# Patient Record
Sex: MALE | Race: WHITE | NOT HISPANIC OR LATINO | Employment: FULL TIME | URBAN - METROPOLITAN AREA
[De-identification: names, ages, dates, MRNs, and addresses within clinical notes are randomized per-mention and may not be internally consistent; named-entity substitution may affect disease eponyms.]

---

## 2019-10-03 ENCOUNTER — OFFICE VISIT (OUTPATIENT)
Dept: HEMATOLOGY | Facility: CLINIC | Age: 39
End: 2019-10-03
Attending: PHYSICIAN ASSISTANT
Payer: COMMERCIAL

## 2019-10-03 VITALS
HEIGHT: 68 IN | WEIGHT: 247 LBS | SYSTOLIC BLOOD PRESSURE: 146 MMHG | BODY MASS INDEX: 37.44 KG/M2 | HEART RATE: 69 BPM | DIASTOLIC BLOOD PRESSURE: 79 MMHG | RESPIRATION RATE: 14 BRPM | TEMPERATURE: 97.8 F | OXYGEN SATURATION: 96 %

## 2019-10-03 DIAGNOSIS — I82.442 ACUTE DVT OF LEFT TIBIAL VEIN (H): Primary | ICD-10-CM

## 2019-10-03 PROCEDURE — 99203 OFFICE O/P NEW LOW 30 MIN: CPT | Performed by: PHYSICIAN ASSISTANT

## 2019-10-03 PROCEDURE — G0463 HOSPITAL OUTPT CLINIC VISIT: HCPCS

## 2019-10-03 PROCEDURE — 99207 ZZC DOWN CODE DUE TO INITIAL EXAM: CPT | Performed by: PHYSICIAN ASSISTANT

## 2019-10-03 ASSESSMENT — PAIN SCALES - GENERAL: PAINLEVEL: MILD PAIN (2)

## 2019-10-03 ASSESSMENT — MIFFLIN-ST. JEOR: SCORE: 2019.88

## 2019-10-03 NOTE — PATIENT INSTRUCTIONS
Stay on your Xarelto 15 mg twice daily for 21 days then 20 mg daily.  Please take with food around the same time each day.     Consider use of compression stockings during waking hours to prevent or minimize post-thrombotic syndrome (PTS). You may need to get new stockings every 3-6 months with regular wear.    We would like you to return to clinic in ~ 3 months.We would like to get a repeat US of your legs at the same time.  This will serve as a new baseline.     We discussed your extensive family history of clotting and would like you to meet with a genetic counselor to document the clotting history in your family and also to discuss possible genetic testing.    Clinic Visits:  With LIZA Sanches  US of your legs - 1/7/2019 @ 930 at [facility]  Meet with Bharti Maxwell Genetic Counselor - 1/7/19 @ 10:30 at [facility]    Call the [facility]  at [phone]   -If surgeries or procedures are planned.    -If off anticoagulation, please call during high risk times (long-distance travel, broken      bones or trauma, immobilization, surgery, pregnancy, or taking estrogen).   -Any new symptoms of DVT (deep vein thrombosis) or PE (pulmonary embolism)    -pain     -swelling     -redness    -warmth    -shortness of breath    -chest pain    -coughing up blood    Your nurse clinician is Christin Zambrano RN at phone number  [phone].  If she is unavailable and you have immediate concerns, please call [phone] and ask for a nurse.     Christin Zambrano RN - Nurse Clinician - [facility] - [phone]    Interested in learning more about blood clots? The [facility] will be hosting educational events and support groups throughout the year. This is your opportunity to get connected to the newest education and important resources, and meet others that have  experienced blood clots.  If you would like to hear more, please text or call [phone] or email [email or web address] to get added to our mailing list.      [facility]  [address] [place] [address], [place] [phone]                                [facility] [phone]    What is rivaroxaban (Xarelto  )? XARELTO  is a prescription medicine used to treat deep vein thrombosis (DVT) and pulmonary embolism, and to help reduce the risk of these conditions reoccurring.  It is also used to prevent clotting after knee & hip replacement.  It is a direct Xa inhibitor (it stops one of the clotting proteins). Your diet (or Vitamin K intake) does not affect this medication. This drug was FDA approved to treat DVT in 2012, but has been used in [place] since 2008. Xarelto was FDA approved for atrial fibrillation in 2011.    How is Xarelto   usually dosed?  For treatment for a new clot, Xarelto  is dosed 15 mg twice daily with food for 21 days.  After that you take 20 mg once daily.  The dosing to prevent clots in your veins after surgery, long flights, or other high risk times: 10mg once daily.  How do I know if I am taking the right dose? Everyone takes the same dose, no matter their weight.  It was not studied in obese patients.  Currently there is no lab test to check the Xarelto  blood level.  However, such testing is being developed and will likely be available in the future.  What are the possible side effects of Xarelto  ?  The most common side effect of Xarelto  is bleeding (i.e. bleeding from gums, nosebleeds, heavier than normal menstrual bleeding, blood in urine or stool). This risk of bleeding is similar to other oral blood thinners.  Get emergency medical help if you have any of these signs of allergic reaction: hives; difficulty breathing; swelling of your face, lips, tongue, throat; or if you should experience any significant bleeding.   Other  side effects include: headaches, feeling dizzy or weak  What if I have surgery or a procedure scheduled? Please call your doctor about holding this medication prior to surgeries, injections into your joints or spine, or other invasive procedures (i.e. endoscopy/colonoscopy if biopsy or polyp removal needed).  Please notify your dentist that you are taking a blood thinner, although certain procedures may not require holding your Xarelto .  Should I be worried that the reversal agent is not readily available?  The reversal agent for Xarelto , Adexxa , is not readily available. However, Xarelto  wears off fairly quickly. If emergent surgery is required or life-threatening bleeding should occur, blood products and medications that promote clotting may be given. Please get examined for potential bleeding after any trauma or head injury.  What should I tell my doctor before starting Xarelto ? Please tell your doctor about past bleeding problems, liver or kidney problems, if you are pregnant or breastfeeding, or taking the following medications: Ketoconazole, Itraconazole, Ritonavir, Lopinavir, Indinavir, Carbamazepine, Phenytoin, Rifampin, or Joe s wort.

## 2019-10-03 NOTE — NURSING NOTE
Harish AvendanoGillesBee is here for a new consult visit and is  being seen for his unprovoked lower extremity clot dx at [facility] on 9/21..     I introduced myself to the patient and provided him with a contact card containing our information.    Educated patient about the signs, symptoms of and risk factors for venous thrombosis (VTE) and provided an educational  book ean, which reviews these facts. And includes the following web links  for further information:  www.stoptheclot.org, www.clotconnect.org.    Together we reviewed the follow up plan and the printed educational sheets. I assisted him in getting follow up appts with provider, genetics and imaging for the same day.  The AVS instructions were then printed and given to the patient.     I  thanked him for coming and encouraged him to call with any concerns or questions.    Christin Zambrano, RN - Nurse Clinician - [facility] - [phone]

## 2019-10-03 NOTE — PROGRESS NOTES
[facility]  [address] [place] [address]  Phone: [phone], Fax: [phone]      Patient: Harish Carr  MRN: [id number]  : 1980  KIMBERLY: October 3, 2019    Reason:  Recently diagnosed left lower extremity DVT (19).    HPI:  Harish is a 39 yo male who noted left calf pain around  and then had evaluation of the leg on 19. On ultrasound he was found to have an occlusive left tibial vein clot, not extending above the knee.  He had no shortness of breath or chest pain thus no CT was done.  He was started on Xarelto 20mg daily and has been taking this every day.  His leg symptoms have improved but not resolved.  He denies significant swelling in the leg.     He denies any recent injury, no surgery, no periods of immobilization.  He does state he has an extensive family history of deep vein thrombosis notable for his mother who is on long term anticoagulation with warfarin.  Some of her clotting events have occurred on anticoagulation per her report and she is negative for factor V Leiden.  No other thrombophilia is mentioned and he does not know if she was tested for other thrombophilia.       ROS:  On anticoagulation he denies any bleeding issues. Denies any ecchymosis. Denies any lower extremity swelling, but still some pain in left calf. Denies any fever, no chest pain. Denies any shortness of breath.    Past Medical History:  Past Medical History:   Diagnosis Date     Asthma        Past Surgical History:  Past Surgical History:   Procedure Laterality Date     SURGICAL HISTORY OF -       hair transplant       Medications:  Current Outpatient Medications   Medication Sig Dispense Refill     albuterol (PROVENTIL HFA: VENTOLIN HFA) 108 (90 BASE) MCG/ACT inhaler Inhale 2 puffs into the lungs every 6 hours as needed for shortness of breath / dyspnea. 1 Inhaler 1     COMPRESSION STOCKINGS Bilateral knee high graduated compression  "stockings 20-30mmHg 4 each 1     rivaroxaban ANTICOAGULANT (XARELTO) 20 MG TABS tablet Take 20 mg by mouth daily (with dinner)       Rivaroxaban ANTICOAGULANT 15 & 20 MG TBPK 15mg po q 12 hours x 21 days followed by 20mg every evening. 1 each 0     acyclovir (ZOVIRAX) 400 MG tablet Take 1 tablet by mouth 3 times daily. (Patient not taking: Reported on 10/3/2019) 15 tablet 3     CLARITIN 10 MG OR TABS 1 TABLET DAILY       fish oil-omega-3 fatty acids (EQL FISH OIL) 1000 MG capsule Take 2 capsules by mouth daily. 180 capsule 12     fluticasone (FLONASE) 50 MCG/ACT nasal spray 1-2 sprays by Both Nostrils route daily. (Patient not taking: Reported on 10/3/2019) 3 Package 1     LANsoprazole (PREVACID) 30 MG capsule Take 1 capsule by mouth daily. (Patient not taking: Reported on 10/3/2019) 90 capsule 3     Multiple Vitamin (MULTI-VITAMIN) per tablet Take 1 tablet by mouth daily. 100 tablet 3        Allergies:  No Known Allergies    Social History:  Reports no significant tobacco or alchol use. 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    Family History:  Extensive maternal family history.  Mother with repeated clotting events and is on long term anticoagulation.  Has one younger sister who has not had clotting issues - although reports \"mini-stroke\" at time of childbirth.  Has 2 maternal aunts with clotting and maternal grandmother with clotting issues.  Does not recall any of them being diagnosed with a thrombophilia.    Objectives:  Pleasant 38 year old male in no acute distress.  Vitals: B/P: 146/79, T: 97.8, P: 69, R: 14, Wt: 247 lbs 0 oz  Exam:   Evaluation of lower legs bilaterally does not show swelling in the left calf, no edema, no warmth, no tenderness.    Labs:  19: Cr: 1.17  No recent LFTs    Imaging:  Ultrasound of left le19 Positive for occlusive thrombus in one of the posterior tibial veins of the proximal to mid left calf.    Assessment:  Harish is a 37 yo male with " unprovoked distal left lower extremity DVT (9/21/19) with extensive maternal family history of DVT.  No known thrombophilia in family.     Discussed that given his extensive family history and that his clotting event, while in a distal vein, was unprovoked thus we would recommend longer term anticoagulation.  We can re-evaluate this at 3 month follow-up visit.    Plan:  1. Continue with Xarelto but start with 15mg po BID x 21 days and then transition back to 20 mg daily.    2. Return to clinic in 3 months for repeat ultrasound of bilateral legs for baseline.   3. Compression stocking for comfort and periods of immobility.  Rx given.  4. Plan to meet with genetic counselor in this clinic at 3 month follow-up visit and can review family history and plan for genetic thrombophilia testing at that time.  Did not check for lupus anticoagulant today as on Xarelto and seems likely to be inherited thrombophilia.   5. Check renal and hepatic function at 3 month follow-up visit.      Patient understands and agrees with the above plan and recommendation.    Total Time Spent:  45 minutes, all 45 minutes was spent on face-to-face consultation of the patient and coordination of care in regard to recent clotting event and treatment plan.          Zoila Chan MPH, PA-C  [facility]  [facility]  [phone] main line  [phone] pager  [phone] fax

## 2019-11-08 ENCOUNTER — HEALTH MAINTENANCE LETTER (OUTPATIENT)
Age: 39
End: 2019-11-08

## 2020-01-15 ENCOUNTER — HOSPITAL ENCOUNTER (OUTPATIENT)
Dept: ULTRASOUND IMAGING | Facility: CLINIC | Age: 40
Discharge: HOME | End: 2020-01-15
Attending: PHYSICIAN ASSISTANT | Admitting: PHYSICIAN ASSISTANT
Payer: COMMERCIAL

## 2020-01-15 DIAGNOSIS — I82.442 ACUTE DVT OF LEFT TIBIAL VEIN (H): ICD-10-CM

## 2020-01-15 PROCEDURE — 93970 EXTREMITY STUDY: CPT

## 2020-01-20 ENCOUNTER — OFFICE VISIT (OUTPATIENT)
Dept: HEMATOLOGY | Facility: CLINIC | Age: 40
End: 2020-01-20
Attending: PHYSICIAN ASSISTANT
Payer: COMMERCIAL

## 2020-01-20 ENCOUNTER — OFFICE VISIT (OUTPATIENT)
Dept: HEMATOLOGY | Facility: CLINIC | Age: 40
End: 2020-01-20
Attending: GENETIC COUNSELOR, MS
Payer: COMMERCIAL

## 2020-01-20 VITALS
RESPIRATION RATE: 16 BRPM | HEIGHT: 68 IN | SYSTOLIC BLOOD PRESSURE: 122 MMHG | OXYGEN SATURATION: 97 % | BODY MASS INDEX: 37.74 KG/M2 | DIASTOLIC BLOOD PRESSURE: 84 MMHG | WEIGHT: 249 LBS | HEART RATE: 96 BPM

## 2020-01-20 DIAGNOSIS — Z82.49 FAMILY HISTORY OF BLOOD CLOTS: Primary | ICD-10-CM

## 2020-01-20 DIAGNOSIS — I82.442 ACUTE DVT OF LEFT TIBIAL VEIN (H): Primary | ICD-10-CM

## 2020-01-20 DIAGNOSIS — Z86.718 PERSONAL HISTORY OF DVT (DEEP VEIN THROMBOSIS): ICD-10-CM

## 2020-01-20 DIAGNOSIS — Z80.42 FAMILY HISTORY OF PROSTATE CANCER: ICD-10-CM

## 2020-01-20 DIAGNOSIS — Z82.49 FAMILY HISTORY OF BLOOD CLOTS: ICD-10-CM

## 2020-01-20 DIAGNOSIS — Z80.3 FAMILY HISTORY OF MALIGNANT NEOPLASM OF BREAST: ICD-10-CM

## 2020-01-20 DIAGNOSIS — Z82.3 FAMILY HISTORY OF STROKE: ICD-10-CM

## 2020-01-20 PROCEDURE — 99214 OFFICE O/P EST MOD 30 MIN: CPT | Performed by: PHYSICIAN ASSISTANT

## 2020-01-20 PROCEDURE — 40000269 ZZH STATISTIC NO CHARGE FACILITY FEE

## 2020-01-20 ASSESSMENT — PAIN SCALES - GENERAL: PAINLEVEL: NO PAIN (0)

## 2020-01-20 ASSESSMENT — MIFFLIN-ST. JEOR: SCORE: 2023.83

## 2020-01-20 NOTE — PROGRESS NOTES
"2020    Presenting Information: Harish Gamboa was seen for an initial evaluation at the [facility] today. I met with him per the request of Zoila Chan, MPH PA-C to obtain a family history. He is here with his mother today.    Personal History:   Briefly, Harish is a 39 year old man with a personal history of a DVT.  Please see Zoila's note for additional details regarding his personal history.     Family History: A three generation pedigree, specific to clotting was obtained today and scanned into the EMR. The following information is significant:     Sister, age 36, had a TIA after delivering her second child at age 32. She has two children, ages 6 and 4.      Mother, age 66 had a TIA at 63. She also has had five DVTs and one PE. Her first DVT was at 28, and then had a DVT and PE at 30. She also reported two DVTs at age 43 and one at age 44. She also had four \"possible clots\" at 64 and 65. One was following a colonoscopy, and it was never determined if she truly had a clot. She had another clot in her other leg after a fall at age 65, which was determined to be a true blood clot. She reports testing negative for Factor V Leiden. She reports having high factor VIII levels in the past.     Lino also reported a strong family history of cancer on his mother's side of the family. His mother's sister had breast cancer at 33. He also has had five maternal uncles (out of six) who had prostate cancer, one of which  from this cancer. His maternal grandfather also had prostate cancer.  Two paternal aunts also had breast cancer in their 60s.    Maternal aunt had a blood clot in her arm at age 68. She has breast cancer at age 33, as noted above.     Maternal aunt had a blood clot in her leg at 70.    Maternal uncle had a stroke at 70.    Maternal grandmother  at 82. She had a past history of a blood clot after delivery in her leg at age 25. She had another clot in her " "leg after a surgery at 50. She had a heart attack at 63 and a stroke at 64. She had also had TIAs.     Maternal-maternal great grandmother  at 42 from a \"blood disease\", that may have triggered a PE in the setting of possible gangrene. The details of this are not clear.     The family history is otherwise negative for clotting.    We also discussed Lino's strong family history of prostate and breast cancer. We discussed that there are genes that can cause increased risk for these cancers, and could impact cancer screening that he may receive if he has a harmful mutation in one of these genes.  We discussed the option of a cancer genetic counseling appointment in the [facility]. Harish was interested, and a referral was placed for this service. He was also given an informational brochure on the program, which includes the scheduling number. He was encouraged to call the scheduling number if he has not received a call from scheduling in about one week.     Plan:   1. A three generation pedigree was obtained and scanned into the EMR.  Additional questions or concerns were denied.  2. I reviewed this family history of clotting with Zoila, and she will review plans for testing for hereditary thrombophilia with Lino today.    Approximate Time Spent in Consultation: 15 minutes.     Bharti Durán MS, [facility]  Licensed Genetic Counselor  P. [phone]  F. [phone]    CC: No Letter       "

## 2020-01-20 NOTE — PROGRESS NOTES
[facility]  [address] [place] [address]  Phone: [phone], Fax: [phone]      Patient: Harish Carr  MRN: [id number]  : 1980  KIMBERLY: 2020    Reason:  History of left lower extremity DVT (19).    HPI:  Harish is a 38 yo male who noted left calf pain around 19 and then had evaluation of the leg on 19. On ultrasound he was found to have an occlusive left tibial vein clot, not extending above the knee.  He had no shortness of breath or chest pain thus no CT was done.  He was started on Xarelto 20mg daily and has been taking this every day.  His leg symptoms have improved but not resolved.  He denies significant swelling in the leg.     He denies any related injury, no surgery, no periods of immobilization.  He does state he has an extensive family history of deep vein thrombosis notable for his mother who is on long term anticoagulation with warfarin.  Some of her clotting events have occurred on anticoagulation per her report and she is negative for factor V Leiden/Prothrombin gene mutation.  No other thrombophilia testing was done on mother.      ROS:  On anticoagulation he denies any bleeding issues. Denies any ecchymosis. Denies any lower extremity swelling, but still some pain in left calf. Denies any fever, no chest pain. Denies any shortness of breath.    Past Medical History:  Past Medical History:   Diagnosis Date     Asthma        Past Surgical History:  Past Surgical History:   Procedure Laterality Date     SURGICAL HISTORY OF -       hair transplant       Medications:  Current Outpatient Medications   Medication Sig Dispense Refill     acyclovir (ZOVIRAX) 400 MG tablet Take 1 tablet by mouth 3 times daily. (Patient not taking: Reported on 10/3/2019) 15 tablet 3     albuterol (PROVENTIL HFA: VENTOLIN HFA) 108 (90 BASE) MCG/ACT inhaler Inhale 2 puffs into the lungs every 6 hours as needed for shortness of  "breath / dyspnea. 1 Inhaler 1     CLARITIN 10 MG OR TABS 1 TABLET DAILY       COMPRESSION STOCKINGS Bilateral knee high graduated compression stockings 20-30mmHg 4 each 1     fluticasone (FLONASE) 50 MCG/ACT nasal spray 1-2 sprays by Both Nostrils route daily. 3 Package 1     LANsoprazole (PREVACID) 30 MG capsule Take 1 capsule by mouth daily. 90 capsule 3     rivaroxaban ANTICOAGULANT (XARELTO) 20 MG TABS tablet Take 20 mg by mouth daily (with dinner)          Allergies:  No Known Allergies    Social History:  Reports no significant tobacco or alchol use. 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    Family History:  Extensive maternal family history.  Mother with repeated clotting events and is on long term anticoagulation.  Has one younger sister who has not had clotting issues - although reports \"mini-stroke\" at time of childbirth.  Has 2 maternal aunts with clotting and maternal grandmother with clotting issues.  Does not recall any of them being diagnosed with a thrombophilia.    Harish is here today for follow-up with mother and they met with genetics counselor and family history was reviewed and outlined.    Objectives:  Pleasant 38 year old male in no acute distress.  There were no vitals taken for this visit.  Exam:   Evaluation of lower legs bilaterally does not show swelling in the left calf, no edema, no warmth, no tenderness.    Labs:  19: Cr: 1.17  No recent LFTs    Imaging:  Ultrasound of left le19 Positive for occlusive thrombus in one of the posterior tibial veins of the proximal to mid left calf.    1/15/20:  The common femoral, greater saphenous origin, femoral, popliteal, and  deep calf veins are visualized and are patent. Venous waveforms are  normal. There is normal response to compression.       Assessment:  Harish is a 40 yo male with unprovoked distal left lower extremity DVT (19) which has now resolved.  He has extensive maternal family history of " DVT.  No known thrombophilia in family.     Discussed that given his extensive family history and that his clotting event, while in a distal vein, was unprovoked thus we would recommend longer term anticoagulation.  He is in agreement with this plan.  We would like to do thrombophilia testing on Harish, ideally off Xarelto.  We will plan for him to be off for 10-14 days and then have thrombophilia testing done and then restart.      Plan:  1. Continue with Xarelto 20mg daily.  Will have a 10-14 day break to have thrombophilia testing done.  Orders have been placed.  Will call him with results. Need to see annually for chronic anticoagulation follow-up.  2. Compression stocking for comfort and periods of immobility.    3.  Check renal and hepatic function with thrombophilia testing.    Patient understands and agrees with the above plan and recommendation.    Total Time Spent:  25 minutes, all 25 minutes was spent on face-to-face consultation of the patient and coordination of care in regard to recent clotting event and treatment plan.          Zoila Chan MPH, PA-C  [facility]  [facility]  [phone] main line  [phone] pager  [phone] fax

## 2020-01-20 NOTE — NURSING NOTE
Harish Gamboa is here for a return visit and is  being seen for history of VTE and family history of VTE.  Seen by genetic counselor today.    Vital signs were taken and assessed.  Allergies and medications were reviewed and updated by [facility] staff.    I greeted Lino and his mom briefly - they have our contact card containing our information.    The follow up plan was developed by the provider confirmed with the patient.     I  thanked them for coming and encouraged them to call with any concerns or questions.    Christin Zambrano RN - Nurse Clinician - [facility] - [phone]

## 2020-01-21 NOTE — TELEPHONE ENCOUNTER
ONCOLOGY INTAKE: Records Information      APPT INFORMATION:  Referring provider: Bharti Durán GC  Referring provider s clinic:  [facility]  Reason for visit/diagnosis: Family history of malignant neoplasm of breast; Family history of prostate cancer  Has patient been notified of appointment date and time?: Yes    RECORDS INFORMATION:  Were the records received with the referral (via [employer])? No    Has patient been seen for any external appt for this diagnosis? No    If yes, where? N/a    Has patient had any imaging or procedures outside of [facility] for this condition? No      If Yes, where? N/a    ADDITIONAL INFORMATION:  None

## 2020-01-22 NOTE — TELEPHONE ENCOUNTER
RECORDS STATUS - BREAST    RECORDS REQUESTED FROM: [employer]   DATE REQUESTED: 3/27/2020   NOTES DETAILS STATUS   OFFICE NOTE from referring provider Bhatri Rubin GC   OFFICE NOTE from medical oncologist N/A    OFFICE NOTE from surgeon N/A    OFFICE NOTE from radiation oncologist     OPERATIVE REPORT     LABS     PATHOLOGY REPORTS  (Tissue diagnosis, Stage, ER/VA percentage positive and intensity of staining, HER2 IHC, FISH, and all biopsies from breast and any distant metastasis)                     GENONOMIC TESTING     TYPE:   (Next Generation Sequencing, including Foundation One testing, and Oncotype score)     IMAGING (NEED IMAGES & REPORT)     CT SCANS N/A    MRI     MAMMO     ULTRASOUND     PET     BONE SCAN     BRAIN MRI

## 2020-02-21 DIAGNOSIS — Z79.01 CHRONIC ANTICOAGULATION: ICD-10-CM

## 2020-02-21 DIAGNOSIS — Z86.718 PERSONAL HISTORY OF DVT (DEEP VEIN THROMBOSIS): Primary | ICD-10-CM

## 2020-03-06 ENCOUNTER — ALLIED HEALTH/NURSE VISIT (OUTPATIENT)
Dept: HEMATOLOGY | Facility: CLINIC | Age: 40
End: 2020-03-06
Payer: COMMERCIAL

## 2020-03-06 DIAGNOSIS — Z82.49 FAMILY HISTORY OF BLOOD CLOTS: ICD-10-CM

## 2020-03-06 DIAGNOSIS — Z86.718 PERSONAL HISTORY OF DVT (DEEP VEIN THROMBOSIS): ICD-10-CM

## 2020-03-06 LAB
ALBUMIN SERPL-MCNC: 4.2 G/DL (ref 3.4–5)
ALP SERPL-CCNC: 65 U/L (ref 40–150)
ALT SERPL W P-5'-P-CCNC: 41 U/L (ref 0–70)
ANION GAP SERPL CALCULATED.3IONS-SCNC: 5 MMOL/L (ref 3–14)
APTT PPP: 28 SEC (ref 22–37)
AST SERPL W P-5'-P-CCNC: 23 U/L (ref 0–45)
AT III ACT/NOR PPP CHRO: 107 % (ref 85–135)
BILIRUB SERPL-MCNC: 0.6 MG/DL (ref 0.2–1.3)
BUN SERPL-MCNC: 13 MG/DL (ref 7–30)
CALCIUM SERPL-MCNC: 9.1 MG/DL (ref 8.5–10.1)
CARDIOLIPIN ANTIBODY IGG: <1.6 GPL-U/ML (ref 0–19.9)
CARDIOLIPIN ANTIBODY IGM: <0.2 MPL-U/ML (ref 0–19.9)
CHLORIDE SERPL-SCNC: 109 MMOL/L (ref 94–109)
CO2 SERPL-SCNC: 27 MMOL/L (ref 20–32)
CREAT SERPL-MCNC: 0.96 MG/DL (ref 0.66–1.25)
GFR SERPL CREATININE-BSD FRML MDRD: >90 ML/MIN/{1.73_M2}
GLUCOSE SERPL-MCNC: 97 MG/DL (ref 70–99)
POTASSIUM SERPL-SCNC: 4.2 MMOL/L (ref 3.4–5.3)
PROT C ACT/NOR PPP CHRO: 100 % (ref 70–170)
PROT SERPL-MCNC: 7.5 G/DL (ref 6.8–8.8)
SODIUM SERPL-SCNC: 141 MMOL/L (ref 133–144)

## 2020-03-06 PROCEDURE — 85730 THROMBOPLASTIN TIME PARTIAL: CPT | Performed by: PHYSICIAN ASSISTANT

## 2020-03-06 PROCEDURE — 85613 RUSSELL VIPER VENOM DILUTED: CPT | Performed by: PHYSICIAN ASSISTANT

## 2020-03-06 PROCEDURE — 85300 ANTITHROMBIN III ACTIVITY: CPT | Performed by: PHYSICIAN ASSISTANT

## 2020-03-06 PROCEDURE — 86146 BETA-2 GLYCOPROTEIN ANTIBODY: CPT | Performed by: PHYSICIAN ASSISTANT

## 2020-03-06 PROCEDURE — 36415 COLL VENOUS BLD VENIPUNCTURE: CPT

## 2020-03-06 PROCEDURE — 00000401 ZZHCL STATISTIC THROMBIN TIME NC: Performed by: PHYSICIAN ASSISTANT

## 2020-03-06 PROCEDURE — 86147 CARDIOLIPIN ANTIBODY EA IG: CPT | Performed by: PHYSICIAN ASSISTANT

## 2020-03-06 PROCEDURE — 81240 F2 GENE: CPT | Performed by: PHYSICIAN ASSISTANT

## 2020-03-06 PROCEDURE — 00000167 ZZHCL STATISTIC INR NC: Performed by: PHYSICIAN ASSISTANT

## 2020-03-06 PROCEDURE — 80053 COMPREHEN METABOLIC PANEL: CPT | Performed by: PHYSICIAN ASSISTANT

## 2020-03-06 PROCEDURE — 81241 F5 GENE: CPT | Performed by: PHYSICIAN ASSISTANT

## 2020-03-06 PROCEDURE — 85306 CLOT INHIBIT PROT S FREE: CPT | Performed by: PHYSICIAN ASSISTANT

## 2020-03-06 PROCEDURE — 85303 CLOT INHIBIT PROT C ACTIVITY: CPT | Performed by: PHYSICIAN ASSISTANT

## 2020-03-06 NOTE — NURSING NOTE
Special coagulation notified that patient has been off Xarelto for 12 days prior to venous collection so Lupus anticoagulant analysis can be validated. He will restart Rivaroxaban today. He also reports learning more VTE family history. I will let his team know so information can be updated.

## 2020-03-09 LAB
B2 GLYCOPROT1 IGG SERPL IA-ACNC: <0.6 U/ML
B2 GLYCOPROT1 IGM SERPL IA-ACNC: <2.9 U/ML
LA PPP-IMP: NEGATIVE
PROT S FREE AG ACT/NOR PPP IA: 165 % (ref 70–148)

## 2020-03-10 LAB — COPATH REPORT: NORMAL

## 2020-03-12 ENCOUNTER — TELEPHONE (OUTPATIENT)
Dept: HEMATOLOGY | Facility: CLINIC | Age: 40
End: 2020-03-12

## 2020-03-12 NOTE — TELEPHONE ENCOUNTER
3/12/2020    Called patient with results of thrombophilia testing.  All results were negative/normal and done off Xarelto briefly (see below).     He would like to remain on anticoagulation due to family and personal history.  Will continue Xarelto 20mg daily.  Briefly discussed option of lower dose Xarelto and for now will remain on 20mg, as not having bleeding issues and is low risk for bleeding with age and health status.     Cr and ALT/AST normal as well.    PLAN:   Continue Xarelto and return to clinic January 2021.  Call with any bleeding issues or plans for procedures.    Zoila Chan PA-C    Results for YOSSI DOLORESKVNG ALLENANTONIO DELANO (MRN [id number]) as of 3/12/2020 14:06   Ref. Range 3/6/2020 08:35   PTT Latest Ref Range: 22 - 37 sec 28   Antithrombin III Chromogenic Latest Ref Range: 85 - 135 % 107   Lupus Result Latest Ref Range: NEG^Negative  Negative   Prot C Chromogenic Latest Ref Range: 70 - 170 % 100   Protein S Free Latest Ref Range: 70 - 148 % 165 (H)  Not a risk for clotting   Cardiolipin Antibody IgG Latest Ref Range: 0.0 - 19.9 GPL-U/mL <1.6   Cardiolipin Antibody IgM Latest Ref Range: 0.0 - 19.9 MPL-U/mL <0.2   Beta 2 Glycoprotein 1 Antibody IgG Latest Ref Range: <7 U/mL <0.6   Beta 2 Glycoprotein 1 Antibody IgM Latest Ref Range: <7 U/mL <2.9     Results for ROLANDO SETHI (MRN [id number]) as of 3/12/2020 14:06   Ref. Range 3/6/2020 08:35   FACTOR 2 AND 5 MUTATION ANALYSIS Unknown Negative for both

## 2020-03-27 ENCOUNTER — PRE VISIT (OUTPATIENT)
Dept: ONCOLOGY | Facility: CLINIC | Age: 40
End: 2020-03-27

## 2020-06-29 ENCOUNTER — TELEPHONE (OUTPATIENT)
Dept: HEMATOLOGY | Facility: CLINIC | Age: 40
End: 2020-06-29

## 2020-06-29 DIAGNOSIS — Z86.718 PERSONAL HISTORY OF DVT (DEEP VEIN THROMBOSIS): Primary | ICD-10-CM

## 2020-06-29 NOTE — TELEPHONE ENCOUNTER
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    He is calling today with new symptoms that are of concern for him.  For the past 6 days, he has been having a feeling of discomfort on medial aspect of his left calf.  He describes this as feeling like a pulled muscle but repositioning leg does not alleviate symptoms.  For the past 4 days, he has had tingling sensation on the lateral aspect of the calf.    He denies swelling or redness in the leg and denies CP.  He has some SOB but attributes this to his seasonal allergies and asthma.      He is active with walking his dog several miles twice daily.  He has had no recent injury or other change in his activity.    Discussed with Zoila Chan PA-C and Dr. Andrew Newman.  Zoila will talk with patient to develop further plan.    Christin Zambrano RN - Nurse Clinician - [facility] - [phone]      Discussed smptoms with patient and decision was made to proceed with scheduling an ultrasound later in the week if symptoms persist.  He has not missed any doses of his Xarelto making the risk of thrombosis minimal.    Order for left lower extremity ultrasound to be done 7/2/2020 if symptoms persist.    Zoila Chan PA-C

## 2020-07-02 ENCOUNTER — HOSPITAL ENCOUNTER (OUTPATIENT)
Dept: ULTRASOUND IMAGING | Facility: CLINIC | Age: 40
Discharge: HOME | End: 2020-07-02
Attending: PHYSICIAN ASSISTANT | Admitting: PHYSICIAN ASSISTANT
Payer: COMMERCIAL

## 2020-07-02 DIAGNOSIS — Z86.718 PERSONAL HISTORY OF DVT (DEEP VEIN THROMBOSIS): ICD-10-CM

## 2020-07-02 PROCEDURE — 93971 EXTREMITY STUDY: CPT | Mod: LT

## 2020-08-17 ENCOUNTER — TRANSFERRED RECORDS (OUTPATIENT)
Dept: HEALTH INFORMATION MANAGEMENT | Facility: CLINIC | Age: 40
End: 2020-08-17

## 2020-09-16 ENCOUNTER — VIRTUAL VISIT (OUTPATIENT)
Dept: ONCOLOGY | Facility: CLINIC | Age: 40
End: 2020-09-16
Attending: GENETIC COUNSELOR, MS
Payer: COMMERCIAL

## 2020-09-16 DIAGNOSIS — Z80.42 FAMILY HISTORY OF PROSTATE CANCER: Primary | ICD-10-CM

## 2020-09-16 DIAGNOSIS — Z80.8 FAMILY HISTORY OF MELANOMA: ICD-10-CM

## 2020-09-16 DIAGNOSIS — Z80.3 FAMILY HISTORY OF MALIGNANT NEOPLASM OF BREAST: ICD-10-CM

## 2020-09-16 PROCEDURE — 96040 ZZH GENETIC COUNSELING, EACH 30 MINUTES: CPT | Mod: 95,ZF | Performed by: GENETIC COUNSELOR, MS

## 2020-09-16 NOTE — PATIENT INSTRUCTIONS
Assessing Cancer Risk  Only about 5-10% of cancers are thought to be due to an inherited cancer susceptibility gene.    These families often have:    Several people with the same or related types of cancer    Cancers diagnosed at a young age (before age 50)    Individuals with more than one primary cancer    Multiple generations of the family affected with cancer    Some people may be candidates for genetic testing of more than one gene.  For these families, genetic testing using a cancer panel may be offered.  These panels will test different genes known to increase the risk for breast, ovarian, uterine, and/or other cancers. All of the genes discussed below have published clinical management guidelines for individuals who are found to carry a mutation. The purpose of this handout is to serve as a brief summary of the genes analyzed by the panels used to inquire about hereditary breast and gynecologic cancer:  ZEN, BRCA1, BRCA2, BRIP1, CDH1, CHEK2, MLH1, MSH2, MSH6, PMS2, EPCAM, PTEN, PALB2, RAD51C, RAD51D, and TP53.  ______________________________________________________________________________  Hereditary Breast and Ovarian Cancer Syndrome   (BRCA1 and BRCA2)  A single mutation in one of the copies of BRCA1 or BRCA2 increases the risk for breast and ovarian cancer, among others.  The risk for pancreatic cancer and melanoma may also be slightly increased in some families.  The chart below shows the chance that someone with a BRCA mutation would develop cancer in his or her lifetime1,2,3,4.        A person s ethnic background is also important to consider, as individuals of Ashkenazi Nondenominational ancestry have a higher chance of having a BRCA gene mutation.  There are three BRCA mutations that occur more frequently in this population.    Peres Syndrome   (MLH1, MSH2, MSH6, PMS2, and EPCAM)  Currently five genes are known to cause Peres Syndrome: MLH1, MSH2, MSH6, PMS2, and EPCAM.  A single mutation in one of the  Peres Syndrome genes increases the risk for colon, endometrial, ovarian, and stomach cancers.  Other cancers that occur less commonly in Peres Syndrome include urinary tract, skin, and brain cancers.  The chart below shows the chance that a person with Peres syndrome would develop cancer in his or her lifetime5.      *Cancer risk varies depending on Peres syndrome gene found    Cowden Syndrome   (PTEN)  Cowden syndrome is a hereditary condition that increases the risk for breast, thyroid, endometrial, colon, and kidney cancer.  Cowden syndrome is caused by a mutation in the PTEN gene.  A single mutation in one of the copies of PTEN causes Cowden syndrome and increases cancer risk.  The chart below shows the chance that someone with a PTEN mutation would develop cancer in their lifetime6,7.  Other benign features seen in some individuals with Cowden syndrome include benign skin lesions (facial papules, keratoses, lipomas), learning disability, autism, thyroid nodules, colon polyps, and larger head size.      *One recent study found breast cancer risk to be increased to 85%    Li-Fraumeni Syndrome   (TP53)  Li-Fraumeni Syndrome (LFS) is a cancer predisposition syndrome caused by a mutation in the TP53 gene. A single mutation in one of the copies of TP53 increases the risk for multiple cancers. Individuals with LFS are at an increased risk for developing cancer at a young age. The lifetime risk for development of a LFS-associated cancer is 50% by age 30 and 90% by age 60.   Core Cancers: Sarcomas, Breast, Brain, Lung, Leukemias/Lymphomas, Adrenocortical carcinomas  Other Cancers: Gastrointestinal, Thyroid, Skin, Genitourinary    Hereditary Diffuse Gastric Cancer   (CDH1)  Currently, one gene is known to cause hereditary diffuse gastric cancer (HDGC): CDH1.  Individuals with HDGC are at increased risk for diffuse gastric cancer and lobular breast cancer. Of people diagnosed with HDGC, 30-50% have a mutation in the CDH1  gene.  This suggests there are likely other genes that may cause HDGC that have not been identified yet.      Lifetime Cancer Risks    General Population HDGC    Diffuse Gastric  <1% ~80%   Breast 12% 39-52%         Additional Genes  ZEN  ZEN is a moderate-risk breast cancer gene. Women who have a mutation in ZEN can have between a 2-4 fold increased risk for breast cancer compared to the general population8. ZEN mutations have also been associated with increased risk for pancreatic cancer, however an estimate of this cancer risk is not well understood9. Individuals who inherit two ZEN mutations have a condition called ataxia-telangiectasia (AT).  This rare autosomal recessive condition affects the nervous system and immune system, and is associated with progressive cerebellar ataxia beginning in childhood.  Individuals with ataxia-telangiectasia often have a weakened immune system and have an increased risk for childhood cancers.    PALB2  Mutations in PALB2 have been shown to increase the risk of breast cancer up to 33-58% in some families; where individuals fall within this risk range is dependent upon family [id number]. PALB2 mutations have also been associated with increased risk for pancreatic cancer, although this risk has not been quantified yet.  Individuals who inherit two PALB2 mutations--one from their mother and one from their father--have a condition called Fanconi Anemia.  This rare autosomal recessive condition is associated with short stature, developmental delay, bone marrow failure, and increased risk for childhood cancers.    CHEK2   CHEK2 is a moderate-risk breast cancer gene.  Women who have a mutation in CHEK2 have around a 2-fold increased risk for breast cancer compared to the general population, and this risk may be higher depending upon family history.11,12,13 Mutations in CHEK2 have also been shown to increase the risk of a number of other cancers, including colon and prostate, however  these cancer risks are currently not well understood.    BRIP1, RAD51C and RAD51D  Mutations in BRIP1, RAD51C, and RAD51D have been shown to increase the risk of ovarian cancer and possibly female breast cancer as well14,15 .       Lifetime Cancer Risk    General Population BRIP1 RAD51C RAD51D   Ovarian 1-2% ~5-8% ~5-9% ~7-15%           Inheritance  All of the cancer syndromes reviewed above are inherited in an autosomal dominant pattern.  This means that if a parent has a mutation, each of his or her children will have a 50% chance of inheriting that same mutation.  Therefore, each child--male or female--would have a 50% chance of being at increased risk for developing cancer.      Image obtained from Genetics Home Reference, 2013     Mutations in some genes can occur de monique, which means that a person s mutation occurred for the first time in them and was not inherited from a parent.  Now that they have the mutation, however, it can be passed on to future generations.    Genetic Testing  Genetic testing involves a blood test and will look at the genetic information in the ZEN, BRCA1, BRCA2, BRIP1, CDH1, CHEK2, MLH1, MSH2, MSH6, PMS2, EPCAM, PTEN, PALB2, RAD51C, RAD51D, and TP53 genes for any harmful mutations that are associated with increased cancer risk.  If possible, it is recommended that the person(s) who has had cancer be tested before other family members.  That person will give us the most useful information about whether or not a specific gene is associated with the cancer in the family.    Results  There are three possible results of genetic testing:    Positive--a harmful mutation was identified in one or more of the genes    Negative--no mutation was identified in any of the genes on this panel    Variant of unknown significance--a variation in one of the genes was identified, but it is unclear how this impacts cancer risk in the family    Advantages and Disadvantages   There are advantages and  disadvantages to genetic testing.    Advantages    May clarify your cancer risk    Can help you make medical decisions    May explain the cancers in your family    May give useful information to your family members (if you share your results)    Disadvantages    Possible negative emotional impact of learning about inherited cancer risk    Uncertainty in interpreting a negative test result in some situations    Possible genetic discrimination concerns (see below)    Genetic Information Nondiscrimination Act (BRNEDON)  BRENDON is a federal law that protects individuals from health insurance or employment discrimination based on a genetic test result alone.  Although rare, there are currently no legal discrimination protections in terms of life insurance, long term care, or disability insurances.  Visit the [employer] [place] website to learn more.    Reducing Cancer Risk  All of the genes described above have nationally recognized cancer screening guidelines that would be recommended for individuals who test positive.  In addition to increased cancer screening, surgeries may be offered or recommended to reduce cancer risk.  Recommendations are based upon an individual s genetic test result as well as their personal and family history of cancer.    Questions to Think About Regarding Genetic Testing:    What effect will the test result have on me and my relationship with my family members if I have an inherited gene mutation?  If I don t have a gene mutation?    Should I share my test results, and how will my family react to this news, which may also affect them?    Are my children ready to learn new information that may one day affect their own health?    Hereditary Cancer Resources    FORCE: Facing Our Risk of Cancer Empowered facingourrisk.org   Bright Pink bebrightpink.org   Li-Fraumeni Syndrome Association lfsassociation.org   PTEN World PTENworld.com   No stomach for cancer, Inc.  nostomachforcancer.org   Stomach cancer relief network Scrnet.org   Collaborative Group of the Americas on Inherited Colorectal Cancer (CGA) cgaicc.com    Cancer Care cancercare.org   American Cancer Society (ACS) cancer.org   National Cancer [place] (NCI) cancer.gov     Please call us if you have any questions or concerns.   Cancer Risk Management Program [phone] ([phone])  ? Lele Snell, MS, [facility] [phone]  ? Cielo Box, MS, [facility]  [phone]  ? Pauline Wills, MS, [facility]  [phone]  ? Carole Murphy, MS, [facility] [phone]  ? Aspen Zo, MS, [facility] [phone]  ? Matthew Jesus, MS, [facility]  [phone]  ? Leslie Wright, MS, [facility]  [phone]    References  1. James RICHMOND, Debi PDP, Mariella S, Cee BRICE, Taina JE, Johnnie JL, Stephania N, Camden H, David O, Nunu A, Andreasini B, Radiosmin P, Mandavidkijoshua S, Ricardo DM, Hernández N, Sunny E, Ar H, Blaine E, Beti J, Gronlatha J, Sammy B, Marcious H, Thorlacius S, Eerola H, Nevjoshualinna H, Antoine K, Polly OP. Average risks of breast and ovarian cancer associated with BRCA1 or BRCA2 mutations detected in case series unselected for family history: a combined analysis of 222 studies. Am J Hum Elissa. 2003;72:1117-30.  2. Chavez N, Jimena M, Cage G.  BRCA1 and BRCA2 Hereditary Breast and Ovarian Cancer. Gene Reviews online. 2013.  3. Bentley YC, Jessica S, Scott G, Ware S. Breast cancer risk among male BRCA1 and BRCA2 mutation carriers. J Natl Cancer Inst. 2007;99:1811-4.  4. John CORONEL, Shannon I, Carlos Manuel J, Kyle E, Karel ER, Macy F. Risk of breast cancer in male BRCA2 carriers. J Med Elissa. 2010;47:710-1.  5. National Comprehensive Cancer Network. Clinical practice guidelines in oncology, colorectal cancer screening. Available online (registration required). 2015.  6. Ivan PROCTOR, Pantera J, Jonnie J, Eitan LA, Guanako FRANCOIS, Giorgio C. Lifetime cancer risks in individuals with germline PTEN mutations. Clin Cancer Res. 2012;18:400-7.  7. Pilarski R. Cowden  Syndrome: A Critical Review of the Clinical Literature. J Elissa . 2009:18:13-27.  8. Jackie A, Noe D, Saul S, Radha P, Fredrick T, Rupal M, Tono B, Keyla H, Marcelo R, Bassam K, Tana L, John DG, Ricardo D, Clay DF, Leola MR, The Breast Cancer Susceptibility Collaboration ([place]) & Tania JOSE. ZEN mutations that cause ataxia-telangiectasia are breast cancer susceptibility alleles. Nature Genetics. 2006;38:873-875  9. Michael N , Zamzam Y, Talia J, Bridgette L, Angie GM , Ivonne ML, Gallinger S, Benedict AG, Syngal S, Betsy ML, Natan J , Brennon R, Ambreen SZ, Ritu JR, Dayo VE, Lupis M, Vocezar B, Elba N, Robert RH, Kaleb KW, and Meño AP. ZEN mutations in patients with hereditary pancreatic cancer. Cancer Discover. 2012;2:41-46  10. James BANKS, et al. Breast-Cancer Risk in Families with Mutations in PALB2. NEJM. 2014; 371(6):497-506.  11. CHEK2 Breast Cancer Case-Control Consortium. CHEK2*1100delC and susceptibility to breast cancer: A collaborative analysis involving 10,860 breast cancer cases and 9,065 controls from 10 studies. Am J Hum Elissa, 74 (2004), pp. 2672-1426  12. Beba T, Negro S, Mckay K, et al. Spectrum of Mutations in BRCA1, BRCA2, CHEK2, and TP53 in Families at High Risk of Breast Cancer. LETTY. 2006;295(12):6239-0924.   13. Migue C, Pearl D, Nannette A, et al. Risk of breast cancer in women with a CHEK2 mutation with and without a family history of breast cancer. J Clin Oncol. 2011;29:5206-5502.  14. See H, Albert E, Elise SJ, et al. Contribution of germline mutations in the RAD51B, RAD51C, and RAD51D genes to ovarian cancer in the population. J Clin Oncol. 2015;33(26):1321-6601. Doi:10.1200/JCO.2015.61.2408.  15. Weston T, Kyle CARTER, Tara P, et al. Mutations in BRIP1 confer high risk of ovarian cancer. Lizy Elissa. 2011;43(11):7419-3670. doi:10.1038/ng.955.

## 2020-09-16 NOTE — PROGRESS NOTES
"9/16/2020    Harish Gamboa is a 39 year old male who is being evaluated via a billable video visit.      The patient has been notified of following:     \"This video visit will be conducted via a call between you and your physician/provider. We have found that certain health care needs can be provided without the need for an in-person physical exam.  This service lets us provide the care you need with a video conversation.  If a prescription is necessary we can send it directly to your pharmacy.  If lab work is needed we can place an order for that and you can then stop by our lab to have the test done at a later time.    Video visits are billed at different rates depending on your insurance coverage.  Please reach out to your insurance provider with any questions.    If during the course of the call the physician/provider feels a video visit is not appropriate, you will not be charged for this service.\"    Patient has given verbal consent for Video visit? Yes    Video-Visit Details    Type of service:  Video Visit    Video Start Time: 9:01 am   Video End Time: 10:16 am    Originating Location (pt. Location): Home    Distant Location (provider location): [facility]    Platform used for Video Visit: [employer]     Video visit joined today by Stephanie Cottrell GC Intern    Referring Provider: Bharti Duárn GC     Presenting Information:   I spoke with Harish Gamboa over video today for genetic counseling to discuss his family history of cancer. With his permission, this appointment was conducted over video due to COVID-19 precautions. We talked today to review this history, cancer screening recommendations, and available genetic testing options.    Personal History:  Harish is a 39 year old male. He does not have any personal history of cancer. He has not yet had a colonoscopy due to his age. He sees dermatology for skin exams. A skin biopsy on 2/22/13 (left chest) revealed: " compound nevus with mild atypia. Harish reported past chewing tobacco use.     He has previously been seen by Bharti Durán MS, [facility] for evaluation of a personal and family history of DVT.    Family History: (Please see scanned pedigree for detailed family history information)  Maternal:  His mother is 66 years old with no known history of cancer. She had ELVA/BSO in her late 40s due to an enlarged uterus.   He has four maternal uncles diagnosed with prostate cancer:  Zackery: diagnosed at age 61, had radical prostatectomy. He is currently 77 years old.   Rambo: diagnosed at age 58, had radical prostatectomy. He has also had colon polyps. He is currently 74 years old.   Jaime: diagnosed at age 52, metastatic to bone. He passed away at age 59.  Akil: diagnosed at age 80. He also had a history of colon polyps requiring partial removal of his intestine. He passed away due to congestive heart failure at age 85.  His maternal aunt was diagnosed with breast cancer at age 33 and had a mastectomy. She was later diagnosed with lung cancer. She had a history of smoking. She passed away at age 71.  His maternal grandfather was diagnosed with prostate cancer at age 80. He passed away at age 86 due to renal failure.   His maternal grandmother was diagnosed with kidney cancer at age 79. She was treated with left nephrectomy and he states that in the process it was discovered that she also had bile duct cancer. She passed away at age 82.   Her father (Harish's great-grandfather) was diagnosed with colon cancer and passed away at age 80.  Paternal:  His father was diagnosed with melanoma at age 40 (right knee), which was treated with surgical excision. This recurred at age 51 in his lymph node. He passed away at age 52.   His paternal aunt was diagnosed with breast cancer at age 65. She was then diagnosed with a rare blood cancer at age 70. She is still living.   Another paternal  aunt was diagnosed with breast cancer at age 48. This was metastatic to her brain. She passed away at age 62.  A third paternal aunt was diagnosed with lung cancer and passed away at age 60. She had a history of heavy smoking and drinking.  His paternal grandmother was diagnosed with lung cancer and passed away at age 72. She had a history of smoking.    He is not aware of any genetic testing in any of his maternal or paternal relatives.    His maternal ethnicity is [place]ian, [place]ian, English. His paternal ethnicity is [place]ian, English, Polish, Tu[place]uan. There is no known Ashkenazi Judaism ancestry on either side of his family.     Discussion:    Harish's family history of cancer is suggestive of a hereditary cancer syndrome.    We reviewed the features of sporadic, familial, and hereditary cancers. In looking at Harish's family history, it is possible that a cancer susceptibility gene is present due to both his maternal and paternal family history of multiple different cancers (primarily prostate and breast on his mother's side and breast and melanoma on his father's side).    We discussed the natural history and genetics of prostate and breast cancer. In particular, we reviewed the BRCA1 and BRCA2 genes. Mutations in these genes cause a condition known as Hereditary Breast and Ovarian Cancer syndrome (HBOC). Women with a mutation in either of these genes are at increased risk for breast and ovarian cancer. There is also an increased risk for a second primary breast cancer. Men with a mutation in either of these genes are at increased risk for breast and prostate cancer. Both women and men may also be at increased risk for pancreatic cancer and melanoma. A detailed handout regarding these genes and other genes in which mutations are associated with an increased risk for breast and gynecologic cancer (which also includes many of the genes associated with prostate cancer) will provided to Harish via  [employer] and can be found in the after visit summary. Topics included: inheritance pattern, cancer risks, cancer screening recommendations, and also risks, benefits and limitations of testing.    Based on his personal and family history, Harish meets current National Comprehensive Cancer Network (NCCN) criteria for genetic testing of high-penetrance breast and/or ovarian cancer susceptibility genes, which often includes BRCA1, BRCA2, CDH1, PALB2, PTEN, and TP53 among others.     We discussed that there are additional genes that could cause increased risk for breast, prostate, melanoma, and other cancers. As many of these genes present with overlapping features in a family and accurate cancer risk cannot always be established based upon the pedigree analysis alone, it would be reasonable for Harish to consider panel genetic testing to analyze multiple genes at once.    We reviewed genetic testing options for the cancers seen in Harish s family history that suggest a hereditary cause: a smaller panel focusing on particular cancers (such as prostate and breast) or expanded panel options to also include genes associated with other types of cancer. Harish expressed an interest in learning as much information as possible from the testing. We discussed expanded panel options including the CancerNext panel and the CancerNext-Expanded panel. He opted for the CancerNext-Expanded panel.   Genetic testing is available for 77 genes associated with increased risk for many different cancers: CancerNext-Expanded (AIP, ALK, APC, ZEN, AXIN2, BAP1, BARD1, BLM, BMPR1A, BRCA1, BRCA2, BRIP1, CDC73, CDH1, CDK4, CDKN1B, CDKN2A, CHEK2, CTNNA1, DICER1, EPCAM, EGFR, EGLN1, FANCC, FH, FLCN, GALNT12, GREM1, HOXB13, KIF1B, KIT, LZTR1, MAX, MEN1, MET, MITF, MLH1, MRE11, MSH2, MSH3, MSH6, MUTYH, NBN, NF1, NF2, NTHL1, PALB2, PDGFRA, PHOX2B, PMS2, POLD1, POLE, POT1, TYXSD1Y, PTCH1, PTEN, RAD50, RAD51C, RAD51D, RB1, RECQL, RET,  SDHA, SDHAF2, SDHB, SDHC, SDHD, SMAD4, SMARCA4, SMARCB1, SMARCE1, STK11, SUFU, FLEU030, TP53, TSC1, TSC2, VHL, and XRCC2).  We discussed that many of the genes in the CancerNext-Expanded panel are associated with specific hereditary cancer syndromes and have published management guidelines:  Hereditary Breast and Ovarian Cancer syndrome (BRCA1, BRCA2), Peres syndrome (MLH1, MSH2, MSH6, PMS2, EPCAM), Familial Adenomatous Polyposis (APC), Hereditary Diffuse Gastric Cancer (CDH1), Familial Atypical Multiple Mole Melanoma syndrome (CDK4, CDKN2A), Juvenile Polyposis syndrome (BMPR1A, SMAD4), Cowden syndrome (PTEN), Li Fraumeni syndrome (TP53), Peutz-Jeghers syndrome (STK11), MUTYH Associated Polyposis (MUTYH), Hereditary Leiomyomatosis and Renal Cell Cancer (FH), Ppfx-Cuxs-Wkod (FLCN), Hereditary Papillary Renal Carcinoma (MET), Hereditary Paraganglioma and Pheochromocytoma syndrome (SDHA, SDHAF2, SDHB, SDHC, SDHD), Multiple Endocrine Neoplasia type 2 (RET), Tuberous sclerosis complex (TSC1, TSC2), Von Hippel-Lindau disease (VHL), Neurofibromatosis type 1 (NF1), Neurofibromatosis type 2 (NF2), Multiple Endocrine Neoplasia type 1 (MEN1), Multiple Endocrine Neoplasia type 4 (CDKN1B), Gorlin syndrome (SUFU, PTCH1), and Wilkes complex ([id number]).  The ZEN, AXIN2, BRIP1, CHEK2, GREM1, MSH3, NBN, NTHL1, PALB2, POLD1, POLE, RAD51C, and RAD51D genes are associated with increased cancer risk and have published management guidelines for certain cancers.    The remaining genes (AIP, ALK, BAP1, BARD1, BLM, CDC73, CTNNA1, DICER1, EGFR, EGLN1, FANCC, GALNT12, HOXB13, KIF1B, KIT, LZTR1, MRE11, MAX, MITF, PDGFRA, PHOX2B, POT1, RAD50, RECQL, RB1, SMARCA4, SMARCB1, SMARCE1, [id number], and XRCC2) are associated with increased cancer risk and may allow us to make medical recommendations when mutations are identified.    Due to COVID-19 precautions, consent was obtained over video today. This is indicated on the consent form, which also  includes my signature (as the provider who obtained consent). Genetic testing via the CancerNext-Expanded panel [facility]. Harish opted to have a saliva sample collection kit shipped directly to his home from [employer]. He will ship the kit back to [facility] for analysis. Turnaround time from date when sample is received at the lab: approximately 3-4 weeks.    Medical Management: For Harish, we reviewed that the information from genetic testing may determine:    additional cancer screening for which Harish may qualify (i.e. clinical breast exams, prostate cancer screening, more frequent colonoscopies, more frequent dermatologic exams, etc.),    and targeted chemotherapies if he were to develop certain cancers in the future (i.e. immunotherapy for individuals with Peres syndrome, PARP inhibitors, etc.).     These recommendations will be discussed in detail once genetic testing is completed.     Plan:  1) Today Harish elected to proceed with genetic testing via the CancerNext-Expanded panel offered by [employer].  2) This information should be available in 4-5 weeks.  3) Harish will receive a phone call to discuss the results.    Time spent over video: 75 minutes    Leslie Wright MS, [facility]  Licensed Genetic Counselor  Office: [phone]

## 2020-09-16 NOTE — LETTER
Cancer Risk Management  Program Locations    [facility]  [facility]  [facility]  Mailing Address  Cancer Risk Management Program  [facility]  [address]  [place] [address]    New patient appointments  [phone]  September 18, 2020    [facility]AN[facility] Rom[facility]  [address] [place] [address]      Dear Harish,    It was a pleasure speaking with you over video for genetic counseling on 9/16/2020. Here is a copy of the progress note from our discussion. If you have any additional questions, please feel free to call.    Referring Provider: Bharti Durán GC     Presenting Information:   I spoke with Harish Odilonbecki Bee over video today for genetic counseling to discuss his family history of cancer. With his permission, this appointment was conducted over video due to COVID-19 precautions. We talked today to review this history, cancer screening recommendations, and available genetic testing options.    Personal History:  Harish is a 39 year old male. He does not have any personal history of cancer. He has not yet had a colonoscopy due to his age. He sees dermatology for skin exams. A skin biopsy on 2/22/13 (left chest) revealed: compound nevus with mild atypia. Harish reported past chewing tobacco use.     He has previously been seen by Bharti Durán MS, [facility] for evaluation of a personal and family history of DVT.    Family History: (Please see scanned pedigree for detailed family history information)  Maternal:  His mother is 66 years old with no known history of cancer. She had ELVA/BSO in her late 40s due to an enlarged uterus.   He has four maternal uncles diagnosed with prostate cancer:  Zackery: diagnosed at age 61, had radical prostatectomy. He is  currently 77 years old.   Rambo: diagnosed at age 58, had radical prostatectomy. He has also had colon polyps. He is currently 74 years old.   Jaime: diagnosed at age 52, metastatic to bone. He passed away at age 59.  Akil: diagnosed at age 80. He also had a history of colon polyps requiring partial removal of his intestine. He passed away due to congestive heart failure at age 85.  His maternal aunt was diagnosed with breast cancer at age 33 and had a mastectomy. She was later diagnosed with lung cancer. She had a history of smoking. She passed away at age 71.  His maternal grandfather was diagnosed with prostate cancer at age 80. He passed away at age 86 due to renal failure.   His maternal grandmother was diagnosed with kidney cancer at age 79. She was treated with left nephrectomy and he states that in the process it was discovered that she also had bile duct cancer. She passed away at age 82.   Her father (Harish's great-grandfather) was diagnosed with colon cancer and passed away at age 80.  Paternal:  His father was diagnosed with melanoma at age 40 (right knee), which was treated with surgical excision. This recurred at age 51 in his lymph node. He passed away at age 52.   His paternal aunt was diagnosed with breast cancer at age 65. She was then diagnosed with a rare blood cancer at age 70. She is still living.   Another paternal aunt was diagnosed with breast cancer at age 48. This was metastatic to her brain. She passed away at age 62.  A third paternal aunt was diagnosed with lung cancer and passed away at age 60. She had a history of heavy smoking and drinking.  His paternal grandmother was diagnosed with lung cancer and passed away at age 72. She had a history of smoking.    He is not aware of any genetic testing in any of his maternal or paternal relatives.    His maternal ethnicity is [place], [place]i [place], English. His paternal ethnicity is [place], English, Hungarian, Filipino. There is no known  Ashkenazi Baptist ancestry on either side of his family.     Discussion:    Harish's family history of cancer is suggestive of a hereditary cancer syndrome.    We reviewed the features of sporadic, familial, and hereditary cancers. In looking at Harish's family history, it is possible that a cancer susceptibility gene is present due to both his maternal and paternal family history of multiple different cancers (primarily prostate and breast on his mother's side and breast and melanoma on his father's side).    We discussed the natural history and genetics of prostate and breast cancer. In particular, we reviewed the BRCA1 and BRCA2 genes. Mutations in these genes cause a condition known as Hereditary Breast and Ovarian Cancer syndrome (HBOC). Women with a mutation in either of these genes are at increased risk for breast and ovarian cancer. There is also an increased risk for a second primary breast cancer. Men with a mutation in either of these genes are at increased risk for breast and prostate cancer. Both women and men may also be at increased risk for pancreatic cancer and melanoma. A detailed handout regarding these genes and other genes in which mutations are associated with an increased risk for breast and gynecologic cancer (which also includes many of the genes associated with prostate cancer) will provided to Harish via [employer] and can be found in the after visit summary. Topics included: inheritance pattern, cancer risks, cancer screening recommendations, and also risks, benefits and limitations of testing.    Based on his personal and family history, Harish meets current National Comprehensive Cancer Network (NCCN) criteria for genetic testing of high-penetrance breast and/or ovarian cancer susceptibility genes, which often includes BRCA1, BRCA2, CDH1, PALB2, PTEN, and TP53 among others.     We discussed that there are additional genes that could cause increased risk for breast,  prostate, melanoma, and other cancers. As many of these genes present with overlapping features in a family and accurate cancer risk cannot always be established based upon the pedigree analysis alone, it would be reasonable for Harish to consider panel genetic testing to analyze multiple genes at once.    We reviewed genetic testing options for the cancers seen in Harish s family history that suggest a hereditary cause: a smaller panel focusing on particular cancers (such as prostate and breast) or expanded panel options to also include genes associated with other types of cancer. Harish expressed an interest in learning as much information as possible from the testing. We discussed expanded panel options including the CancerNext panel and the CancerNext-Expanded panel. He opted for the CancerNext-Expanded panel.   Genetic testing is available for 77 genes associated with increased risk for many different cancers: CancerNext-Expanded (AIP, ALK, APC, ZEN, AXIN2, BAP1, BARD1, BLM, BMPR1A, BRCA1, BRCA2, BRIP1, CDC73, CDH1, CDK4, CDKN1B, CDKN2A, CHEK2, CTNNA1, DICER1, EPCAM, EGFR, EGLN1, FANCC, FH, FLCN, GALNT12, GREM1, HOXB13, KIF1B, KIT, LZTR1, MAX, MEN1, MET, MITF, MLH1, MRE11, MSH2, MSH3, MSH6, MUTYH, NBN, NF1, NF2, NTHL1, PALB2, PDGFRA, PHOX2B, PMS2, POLD1, POLE, POT1, IWGPN5L, PTCH1, PTEN, RAD50, RAD51C, RAD51D, RB1, RECQL, RET, SDHA, SDHAF2, SDHB, SDHC, SDHD, SMAD4, SMARCA4, SMARCB1, SMARCE1, STK11, SUFU, QRFE177, TP53, TSC1, TSC2, VHL, and XRCC2).  We discussed that many of the genes in the CancerNext-Expanded panel are associated with specific hereditary cancer syndromes and have published management guidelines:  Hereditary Breast and Ovarian Cancer syndrome (BRCA1, BRCA2), Peres syndrome (MLH1, MSH2, MSH6, PMS2, EPCAM), Familial Adenomatous Polyposis (APC), Hereditary Diffuse Gastric Cancer (CDH1), Familial Atypical Multiple Mole Melanoma syndrome (CDK4, CDKN2A), Juvenile Polyposis syndrome  (BMPR1A, SMAD4), Cowden syndrome (PTEN), Li Fraumeni syndrome (TP53), Peutz-Jeghers syndrome (STK11), MUTYH Associated Polyposis (MUTYH), Hereditary Leiomyomatosis and Renal Cell Cancer (FH), Jney-Qlnt-Ivha (FLCN), Hereditary Papillary Renal Carcinoma (MET), Hereditary Paraganglioma and Pheochromocytoma syndrome (SDHA, SDHAF2, SDHB, SDHC, SDHD), Multiple Endocrine Neoplasia type 2 (RET), Tuberous sclerosis complex (TSC1, TSC2), Von Hippel-Lindau disease (VHL), Neurofibromatosis type 1 (NF1), Neurofibromatosis type 2 (NF2), Multiple Endocrine Neoplasia type 1 (MEN1), Multiple Endocrine Neoplasia type 4 (CDKN1B), Gorlin syndrome (SUFU, PTCH1), and Wilkes complex (VQGDR3X).  The ZEN, AXIN2, BRIP1, CHEK2, GREM1, MSH3, NBN, NTHL1, PALB2, POLD1, POLE, RAD51C, and RAD51D genes are associated with increased cancer risk and have published management guidelines for certain cancers.    The remaining genes (AIP, ALK, BAP1, BARD1, BLM, CDC73, CTNNA1, DICER1, EGFR, EGLN1, FANCC, GALNT12, HOXB13, KIF1B, KIT, LZTR1, MRE11, MAX, MITF, PDGFRA, PHOX2B, POT1, RAD50, RECQL, RB1, SMARCA4, SMARCB1, SMARCE1, LPBF993, and XRCC2) are associated with increased cancer risk and may allow us to make medical recommendations when mutations are identified.    Due to COVID-19 precautions, consent was obtained over video today. This is indicated on the consent form, which also includes my signature (as the provider who obtained consent). Genetic testing via the CancerSole[facility]t-Expanded panel will be sent to [facility]. Harish opted to have a saliva sample collection kit shipped directly to his home from [facility]. He will ship the kit back to [facility] for analysis. Turnaround time from date when sample is received at the lab: approximately 3-4 weeks.    Medical Management: For Harish, we reviewed that the information from genetic testing may determine:    additional cancer screening for which Harish may qualify (i.e.  clinical breast exams, prostate cancer screening, more frequent colonoscopies, more frequent dermatologic exams, etc.),    and targeted chemotherapies if he were to develop certain cancers in the future (i.e. immunotherapy for individuals with Peres syndrome, PARP inhibitors, etc.).     These recommendations will be discussed in detail once genetic testing is completed.     Plan:  1) Today Harish elected to proceed with genetic testing via the CancerNext-Expanded panel offered by [employer].  2) This information should be available in 4-5 weeks.  3) Harish will receive a phone call to discuss the results.    Leslie Wright MS, [facility]  Licensed Genetic Counselor  Office: [phone]  Email: [email or web address]                                                                            Assessing Cancer Risk  Only about 5-10% of cancers are thought to be due to an inherited cancer susceptibility gene.    These families often have:    Several people with the same or related types of cancer    Cancers diagnosed at a young age (before age 50)    Individuals with more than one primary cancer    Multiple generations of the family affected with cancer    Some people may be candidates for genetic testing of more than one gene.  For these families, genetic testing using a cancer panel may be offered.  These panels will test different genes known to increase the risk for breast, ovarian, uterine, and/or other cancers. All of the genes discussed below have published clinical management guidelines for individuals who are found to carry a mutation. The purpose of this handout is to serve as a brief summary of the genes analyzed by the panels used to inquire about hereditary breast and gynecologic cancer:  ZEN, BRCA1, BRCA2, BRIP1, CDH1, CHEK2, MLH1, MSH2, MSH6, PMS2, EPCAM, PTEN, PALB2, RAD51C, RAD51D, and TP53.  ______________________________________________________________________________  Hereditary Breast and  Ovarian Cancer Syndrome   (BRCA1 and BRCA2)  A single mutation in one of the copies of BRCA1 or BRCA2 increases the risk for breast and ovarian cancer, among others.  The risk for pancreatic cancer and melanoma may also be slightly increased in some families.  The chart below shows the chance that someone with a BRCA mutation would develop cancer in his or her lifetime1,2,3,4.        A person s ethnic background is also important to consider, as individuals of Ashkenazi Sikhism ancestry have a higher chance of having a BRCA gene mutation.  There are three BRCA mutations that occur more frequently in this population.    Peres Syndrome   (MLH1, MSH2, MSH6, PMS2, and EPCAM)  Currently five genes are known to cause Peres Syndrome: MLH1, MSH2, MSH6, PMS2, and EPCAM.  A single mutation in one of the Peres Syndrome genes increases the risk for colon, endometrial, ovarian, and stomach cancers.  Other cancers that occur less commonly in Peres Syndrome include urinary tract, skin, and brain cancers.  The chart below shows the chance that a person with Peres syndrome would develop cancer in his or her lifetime5.      *Cancer risk varies depending on Peres syndrome gene found    Cowden Syndrome   (PTEN)  Cowden syndrome is a hereditary condition that increases the risk for breast, thyroid, endometrial, colon, and kidney cancer.  Cowden syndrome is caused by a mutation in the PTEN gene.  A single mutation in one of the copies of PTEN causes Cowden syndrome and increases cancer risk.  The chart below shows the chance that someone with a PTEN mutation would develop cancer in their lifetime6,7.  Other benign features seen in some individuals with Cowden syndrome include benign skin lesions (facial papules, keratoses, lipomas), learning disability, autism, thyroid nodules, colon polyps, and larger head size.      *One recent study found breast cancer risk to be increased to 85%    Li-Fraumeni Syndrome   (TP53)  Li-Fraumeni Syndrome  (LFS) is a cancer predisposition syndrome caused by a mutation in the TP53 gene. A single mutation in one of the copies of TP53 increases the risk for multiple cancers. Individuals with LFS are at an increased risk for developing cancer at a young age. The lifetime risk for development of a LFS-associated cancer is 50% by age 30 and 90% by age 60.   Core Cancers: Sarcomas, Breast, Brain, Lung, Leukemias/Lymphomas, Adrenocortical carcinomas  Other Cancers: Gastrointestinal, Thyroid, Skin, Genitourinary    Hereditary Diffuse Gastric Cancer   (CDH1)  Currently, one gene is known to cause hereditary diffuse gastric cancer (HDGC): CDH1.  Individuals with HDGC are at increased risk for diffuse gastric cancer and lobular breast cancer. Of people diagnosed with HDGC, 30-50% have a mutation in the CDH1 gene.  This suggests there are likely other genes that may cause HDGC that have not been identified yet.      Lifetime Cancer Risks    General Population HDGC    Diffuse Gastric  <1% ~80%   Breast 12% 39-52%         Additional Genes  ZEN  ZEN is a moderate-risk breast cancer gene. Women who have a mutation in ZEN can have between a 2-4 fold increased risk for breast cancer compared to the general population8. ZEN mutations have also been associated with increased risk for pancreatic cancer, however an estimate of this cancer risk is not well understood9. Individuals who inherit two ZEN mutations have a condition called ataxia-telangiectasia (AT).  This rare autosomal recessive condition affects the nervous system and immune system, and is associated with progressive cerebellar ataxia beginning in childhood.  Individuals with ataxia-telangiectasia often have a weakened immune system and have an increased risk for childhood cancers.    PALB2  Mutations in PALB2 have been shown to increase the risk of breast cancer up to 33-58% in some families; where individuals fall within this risk range is dependent upon family [email or web address].  PALB2 mutations have also been associated with increased risk for pancreatic cancer, although this risk has not been quantified yet.  Individuals who inherit two PALB2 mutations--one from their mother and one from their father--have a condition called Fanconi Anemia.  This rare autosomal recessive condition is associated with short stature, developmental delay, bone marrow failure, and increased risk for childhood cancers.    CHEK2   CHEK2 is a moderate-risk breast cancer gene.  Women who have a mutation in CHEK2 have around a 2-fold increased risk for breast cancer compared to the general population, and this risk may be higher depending upon family history.11,12,13 Mutations in CHEK2 have also been shown to increase the risk of a number of other cancers, including colon and prostate, however these cancer risks are currently not well understood.    BRIP1, RAD51C and RAD51D  Mutations in BRIP1, RAD51C, and RAD51D have been shown to increase the risk of ovarian cancer and possibly female breast cancer as well14,15 .       Lifetime Cancer Risk    General Population BRIP1 RAD51C RAD51D   Ovarian 1-2% ~5-8% ~5-9% ~7-15%           Inheritance  All of the cancer syndromes reviewed above are inherited in an autosomal dominant pattern.  This means that if a parent has a mutation, each of his or her children will have a 50% chance of inheriting that same mutation.  Therefore, each child--male or female--would have a 50% chance of being at increased risk for developing cancer.      Image obtained from Genetics Home Reference, 2013     Mutations in some genes can occur de monique, which means that a person s mutation occurred for the first time in them and was not inherited from a parent.  Now that they have the mutation, however, it can be passed on to future generations.    Genetic Testing  Genetic testing involves a blood test and will look at the genetic information in the ZEN, BRCA1, BRCA2, BRIP1, CDH1, CHEK2, MLH1, MSH2,  MSH6, PMS2, EPCAM, PTEN, PALB2, RAD51C, RAD51D, and TP53 genes for any harmful mutations that are associated with increased cancer risk.  If possible, it is recommended that the person(s) who has had cancer be tested before other family members.  That person will give us the most useful information about whether or not a specific gene is associated with the cancer in the family.    Results  There are three possible results of genetic testing:    Positive--a harmful mutation was identified in one or more of the genes    Negative--no mutation was identified in any of the genes on this panel    Variant of unknown significance--a variation in one of the genes was identified, but it is unclear how this impacts cancer risk in the family    Advantages and Disadvantages   There are advantages and disadvantages to genetic testing.    Advantages    May clarify your cancer risk    Can help you make medical decisions    May explain the cancers in your family    May give useful information to your family members (if you share your results)    Disadvantages    Possible negative emotional impact of learning about inherited cancer risk    Uncertainty in interpreting a negative test result in some situations    Possible genetic discrimination concerns (see below)    Genetic Information Nondiscrimination Act (BRENDON)  BRENDON is a federal law that protects individuals from health insurance or employment discrimination based on a genetic test result alone.  Although rare, there are currently no legal discrimination protections in terms of life insurance, long term care, or disability insurances.  Visit the National Human Genome Research [place] website to learn more.    Reducing Cancer Risk  All of the genes described above have nationally recognized cancer screening guidelines that would be recommended for individuals who test positive.  In addition to increased cancer screening, surgeries may be offered or recommended to reduce cancer  risk.  Recommendations are based upon an individual s genetic test result as well as their personal and family history of cancer.    Questions to Think About Regarding Genetic Testing:    What effect will the test result have on me and my relationship with my family members if I have an inherited gene mutation?  If I don t have a gene mutation?    Should I share my test results, and how will my family react to this news, which may also affect them?    Are my children ready to learn new information that may one day affect their own health?    Hereditary Cancer Resources    FORCE: Facing Our Risk of Cancer Empowered facingourrisk.org   Bright Pink bebrightpink.org   Li-Fraumeni Syndrome Association lfsassociation.org   PTEN World PTENworld.com   No stomach for cancer, Inc. nostomachforcancer.org   Stomach cancer relief network Scrnet.org   Collaborative Group of the Americas on Inherited Colorectal Cancer (CGA) cgaicc.com    Cancer Care cancercare.org   American Cancer Society (ACS) cancer.org   National Cancer Victor (NCI) cancer.gov     Please call us if you have any questions or concerns.   Cancer Risk Management Program [phone] ([phone])  ? Lele Snell, MS, [facility] [phone]  ? Cielo Box, MS, [facility]  [phone]  ? Pauline Wills, MS, [facility]  [phone]  ? Carole Murphy, MS, [facility] [phone]  ? Aspen Pathak, MS, [facility] [phone]  ? Matthew Jesus, MS, [facility]  [phone]  ? Leslie Wright, MS, [facility]  [phone]    References  1. Debi FrederickP, Mariella S, Cee BRICE, Taina JE, Johnnie JL, Stephania N, Camden H, David O, Nunu A, Johana B, Tyler P, Arielle S, Ricardo DM, Edgar N, Sunny E, Ar EMERSON, Blaine E, Beti J, Dinorah J, Sammy B, Oniel H, Thorlaci S, Eerola H, Grace H, Antoine K, Polly OP. Average risks of breast and ovarian cancer associated with BRCA1 or BRCA2 mutations detected in case series unselected for family history: a combined analysis of 222 studies. Am J  Hum Elissa. 2003;72:1117-30.  2. Chavez N, Jimena M, Fauzia G.  BRCA1 and BRCA2 Hereditary Breast and Ovarian Cancer. Gene Reviews online. 2013.  3. Bentley YC, Jessica S, Scott G, Jeanie S. Breast cancer risk among male BRCA1 and BRCA2 mutation carriers. J Natl Cancer Inst. 2007;99:1811-4.  4. John CORONEL, Shannon I, Carlos Manuel J, Kyle E, Karel ER, Macy F. Risk of breast cancer in male BRCA2 carriers. J Med Elissa. 2010;47:710-1.  5. National Comprehensive Cancer Network. Clinical practice guidelines in oncology, colorectal cancer screening. Available online (registration required). 2015.  6. Ivan MH, Pantera J, Jonnie J, Eitan PARKS, Guanako MS, Giorgio C. Lifetime cancer risks in individuals with germline PTEN mutations. Clin Cancer Res. 2012;18:400-7.  7. Pilarski R. Cowden Syndrome: A Critical Review of the Clinical Literature. J Elissa . 2009:18:13-27.  8. Jackie A, Noe D, Saul S, Radha P, Fredrick T, Rupal M, Tono B, Jamagaly H, Marcelo R, Bassam K, Tana L, John CORONEL, Ricardo D, Clay DF, Leola MR, The Breast Cancer Susceptibility Collaboration (UK) & Tania N. ZEN mutations that cause ataxia-telangiectasia are breast cancer susceptibility alleles. Nature Genetics. 2006;38:873-875  9. Michael N , Zamzam Y, Talia J, Bridgette L, Angie GM , Ivonne ML, Gallinger S, Benedict AG, Syngal S, Betsy ML, Natan J , Brennon R, Ambreen SZ, Esfaby JR, Dayo VE, Lupis M, Vogelstein B, Elba N, Robert RH, Kaleb KW, and Meño AP. ZEN mutations in patients with hereditary pancreatic cancer. Cancer Discover. 2012;2:41-46  10. James BANKS, et al. Breast-Cancer Risk in Families with Mutations in PALB2. NEJM. 2014; 371(6):497-506.  11. CHEK2 Breast Cancer Case-Control Consortium. CHEK2*1100delC and susceptibility to breast cancer: A collaborative analysis involving 10,860 breast cancer cases and 9,065 controls from 10 studies. Am J Hum Elissa, 74 (2004), pp. 9980-1837  12. Beba COMER, Negro S, Mckay CONNOLLY,  et al. Spectrum of Mutations in BRCA1, BRCA2, CHEK2, and TP53 in Families at High Risk of Breast Cancer. LETTY. 2006;295(12):3642-3475.   13. Migue PARIKH, Pearl D, Nannette RICHMOND, et al. Risk of breast cancer in women with a CHEK2 mutation with and without a family history of breast cancer. J Clin Oncol. 2011;29:0437-4596.  14. See H, Albert E, Elise SJ, et al. Contribution of germline mutations in the RAD51B, RAD51C, and RAD51D genes to ovarian cancer in the population. J Clin Oncol. 2015;33(26):2006-3417. Doi:10.1200/JCO.2015.61.2408.  15. Weston T, Kyle CARTER, Tara P, et al. Mutations in BRIP1 confer high risk of ovarian cancer. Lizy Elissa. 2011;43(11):5466-2035. doi:10.1038/ng.955.

## 2020-09-28 DIAGNOSIS — Z79.01 CHRONIC ANTICOAGULATION: ICD-10-CM

## 2020-09-28 DIAGNOSIS — Z86.718 PERSONAL HISTORY OF DVT (DEEP VEIN THROMBOSIS): ICD-10-CM

## 2020-09-28 RX ORDER — RIVAROXABAN 20 MG/1
TABLET, FILM COATED ORAL
Qty: 30 TABLET | Refills: 6 | Status: SHIPPED | OUTPATIENT
Start: 2020-09-28 | End: 2021-05-03

## 2020-09-30 DIAGNOSIS — Z80.42 FAMILY HISTORY OF PROSTATE CANCER: ICD-10-CM

## 2020-09-30 DIAGNOSIS — Z80.3 FAMILY HISTORY OF MALIGNANT NEOPLASM OF BREAST: ICD-10-CM

## 2020-09-30 DIAGNOSIS — Z80.8 FAMILY HISTORY OF MELANOMA: ICD-10-CM

## 2020-10-27 LAB — MISCELLANEOUS TEST: NORMAL

## 2020-10-28 LAB — LAB SCANNED RESULT: NORMAL

## 2020-11-13 ENCOUNTER — VIRTUAL VISIT (OUTPATIENT)
Dept: ONCOLOGY | Facility: CLINIC | Age: 40
End: 2020-11-13
Attending: GENETIC COUNSELOR, MS
Payer: COMMERCIAL

## 2020-11-13 DIAGNOSIS — Z80.42 FAMILY HISTORY OF PROSTATE CANCER: Primary | ICD-10-CM

## 2020-11-13 DIAGNOSIS — Z80.8 FAMILY HISTORY OF MELANOMA: ICD-10-CM

## 2020-11-13 DIAGNOSIS — Z80.3 FAMILY HISTORY OF MALIGNANT NEOPLASM OF BREAST: ICD-10-CM

## 2020-11-13 PROCEDURE — 999N000069 HC STATISTIC GENETIC COUNSELING, < 16 MIN: Mod: GT | Performed by: GENETIC COUNSELOR, MS

## 2020-11-13 NOTE — PROGRESS NOTES
"11/13/2020    Harish Gamboa is a 40 year old male who is being evaluated via a billable telephone visit.      The patient has been notified of following:     \"This telephone visit will be conducted via a call between you and your physician/provider. We have found that certain health care needs can be provided without the need for a physical exam.  This service lets us provide the care you need with a short phone conversation.  If a prescription is necessary we can send it directly to your pharmacy.  If lab work is needed we can place an order for that and you can then stop by our lab to have the test done at a later time.    Telephone visits are billed at different rates depending on your insurance coverage. During this emergency period, for some insurers they may be billed the same as an in-person visit.  Please reach out to your insurance provider with any questions.    If during the course of the call the physician/provider feels a telephone visit is not appropriate, you will not be charged for this service.\"    Patient has given verbal consent for Telephone visit?  Yes    Phone call duration: 7 minutes    Referring Provider: Bharti Durán GC     Presenting Information:  I spoke to Harish by phone today to discuss his genetic testing results. Testing was performed on a saliva sample collected by Harish at his home. The CancerNext-Expanded panel was ordered from [employer]. This testing was done because of Harish's family history of cancer.    Genetic Testing Result: NEGATIVE  Harish is negative for mutations in the 77 genes analyzed: AIP, ALK, APC, ZEN, AXIN2, BAP1, BARD1, BLM, BMPR1A, BRCA1, BRCA2, BRIP1, CDC73, CDH1, CDK4, CDKN1B, CDKN2A, CHEK2, CTNNA1, DICER1, FANCC, FH, FLCN, GALNT12, KIF1B, LZTR1, MAX, MEN1, MET, MLH1, MSH2, MSH3, MSH6, MUTYH, NBN, NF1, NF2, NTHL1, PALB2, PHOX2B, PMS2, POT1, FDTGL1H, PTCH1, PTEN, RAD51C, RAD51D, RB1, RECQL, RET, SDHA, SDHAF2, SDHB, SDHC, " "SDHD, SMAD4, SMARCA4, SMARCB1, SMARCE1, STK11, SUFU, FCQE411, TP53, TSC1, TSC2, VHL and XRCC2 (sequencing and deletion/duplication); EGFR, EGLN1, HOXB13, KIT, MITF, PDGFRA, POLD1 and POLE (sequencing only); EPCAM and GREM1 (deletion/duplication only).       A copy of the test report can be found in the Laboratory tab, dated 9/30/20, and named \"[facility] TEST\". The report is scanned in as a linked document.    Interpretation:  We discussed several different interpretations of this negative test result.    1. One explanation may be that there is a different gene or combination of genes and environment that are associated with the cancers in this family.  2. It is possible that his relatives have a mutation in one of these genes and he did not inherit it.  3. There is also a small possibility that there is a mutation in one of these genes, and the testing laboratory could not find it with their current testing methods.       Screening:  Based on this negative test result, it is important for Harish and his relatives to refer back to the family history for appropriate cancer screening.      He has a maternal family history of four uncles and his grandfather who all had prostate cancer. He should discuss this family history and recommended prostate cancer screening with his primary care physician.    He should also continue with his skin exams based on his family history of melanoma. Other population cancer screening options, including colonoscopy and those recommended by the American Cancer Society and the National Comprehensive Cancer Network (NCCN), are also appropriate for Harish and his family. These screening recommendations may change if there are changes to Harish's personal and/or family history of cancer. Final screening recommendations should be made by each individual's managing physician.    Inheritance:  We reviewed the autosomal dominant inheritance of mutations in these genes. " Mutations in these genes do not skip generations.      Additional Testing Considerations:  Although Harish's genetic testing result was negative, other relatives may still carry a gene mutation associated with an increased risk for cancer. Genetic counseling is recommended for his sister, maternal, and paternal relatives to discuss genetic testing options. If any of these relatives do pursue genetic testing, Harish is encouraged to contact me so that we may review the impact of their test results on him.    Summary:  We do not have an explanation for Harish's family history of cancer. While no genetic changes were identified, Harish may still be at risk for certain cancers due to family history, environmental factors, or other genetic causes not identified by this test. Because of that, it is important that he continue with cancer screening based on his personal and family history as discussed above.    Genetic testing is rapidly advancing, and new cancer susceptibility genes will most likely be identified in the future. Therefore, I encouraged Harish to contact me annually or if there are changes in his personal or family history. This may change how we assess his cancer risk, screening, and the testing we would offer.    Plan:  1. A copy of the test results will be mailed to Harish.  2. He plans to follow-up with his physicians.  3. He should contact me annually, or sooner if his family history changes.    If Harish has any further questions, I encouraged him to contact me at [phone].    Time spent on the phone: 7 minutes.    Leslie Wright MS, [facility]  Licensed, Certified Genetic Counselor  Office: [phone]  Email: [email or web address]

## 2020-11-13 NOTE — LETTER
Cancer Risk Management  Program [facility]  [facility]  Mailing Address  Cancer Risk Management Program  [address]  [place] [address]    New patient appointments  [phone]  November 20, 2020    Harish Gamboa  [address] [place] [address]      Dear Harish,    It was a pleasure speaking with you on the phone for genetic counseling on 11/13/2020. Here is a copy of the progress note from our discussion. If you have any additional questions, please feel free to call.    Referring Provider: Bharti Durán GC     Presenting Information:  I spoke to Harish by phone today to discuss his genetic testing results. Testing was performed on a saliva sample collected by Harish at his home. The CancerNext-Expanded panel was ordered from [employer]. This testing was done because of Harish's family history of cancer.    Genetic Testing Result: NEGATIVE  Harish is negative for mutations in the 77 genes analyzed: AIP, ALK, APC, ZEN, AXIN2, BAP1, BARD1, BLM, BMPR1A, BRCA1, BRCA2, BRIP1, CDC73, CDH1, CDK4, CDKN1B, CDKN2A, CHEK2, CTNNA1, DICER1, FANCC, FH, FLCN, GALNT12, KIF1B, LZTR1, MAX, MEN1, MET, MLH1, MSH2, MSH3, MSH6, MUTYH, NBN, NF1, NF2, NTHL1, PALB2, PHOX2B, PMS2, POT1, HISIR9E, PTCH1, PTEN, RAD51C, RAD51D, RB1, RECQL, RET, SDHA, SDHAF2, SDHB, SDHC, SDHD, SMAD4, SMARCA4, SMARCB1, SMARCE1, STK11, SUFU, GZHZ589, TP53, TSC1, TSC2, VHL and XRCC2 (sequencing and deletion/duplication); EGFR, EGLN1, HOXB13, KIT, MITF, PDGFRA, POLD1 and POLE (sequencing only); EPCAM and GREM1 (deletion/duplication only).       Interpretation:  We discussed several different interpretations of this negative test result.    1. One explanation may be that there is a  different gene or combination of genes and environment that are associated with the cancers in this family.  2. It is possible that his relatives have a mutation in one of these genes and he did not inherit it.  3. There is also a small possibility that there is a mutation in one of these genes, and the testing laboratory could not find it with their current testing methods.       Screening:  Based on this negative test result, it is important for Harish and his relatives to refer back to the family history for appropriate cancer screening.      He has a maternal family history of four uncles and his grandfather who all had prostate cancer. He should discuss this family history and recommended prostate cancer screening with his primary care physician.    He should also continue with his skin exams based on his family history of melanoma. Other population cancer screening options, including colonoscopy and those recommended by the American Cancer Society and the National Comprehensive Cancer Network (NCCN), are also appropriate for Harish and his family. These screening recommendations may change if there are changes to Harish's personal and/or family history of cancer. Final screening recommendations should be made by each individual's managing physician.    Inheritance:  We reviewed the autosomal dominant inheritance of mutations in these genes. Mutations in these genes do not skip generations.      Additional Testing Considerations:  Although Harish's genetic testing result was negative, other relatives may still carry a gene mutation associated with an increased risk for cancer. Genetic counseling is recommended for his sister, maternal, and paternal relatives to discuss genetic testing options. If any of these relatives do pursue genetic testing, Harish is encouraged to contact me so that we may review the impact of their test results on him.    Summary:  We do not have an explanation  for Harish's family history of cancer. While no genetic changes were identified, Harish may still be at risk for certain cancers due to family history, environmental factors, or other genetic causes not identified by this test. Because of that, it is important that he continue with cancer screening based on his personal and family history as discussed above.    Genetic testing is rapidly advancing, and new cancer susceptibility genes will most likely be identified in the future. Therefore, I encouraged Harish to contact me annually or if there are changes in his personal or family history. This may change how we assess his cancer risk, screening, and the testing we would offer.    Plan:  1. A copy of the test results will be mailed to Harish.  2. He plans to follow-up with his physicians.  3. He should contact me annually, or sooner if his family history changes.    If Harish has any further questions, I encouraged him to contact me at [phone].    Leslie Wright MS, [facility]  Licensed, Certified Genetic Counselor  Office: [phone]  Email: [email or web address]

## 2020-11-13 NOTE — Clinical Note
Please send copy of letter to patient with test results. Please enclose test results: Send outs misc test [[id number]] (Order [id number])

## 2020-11-30 ENCOUNTER — TELEPHONE (OUTPATIENT)
Dept: HEMATOLOGY | Facility: CLINIC | Age: 40
End: 2020-11-30

## 2020-11-30 NOTE — TELEPHONE ENCOUNTER
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    He is calling today with concerning symptoms in his left leg after long distance travel over the past few days.  He traveled to [place] and back, in an RV over the past 5 days.  He did not drive the whole way and when not driving, he was able to get up and move around the vehicle.    He reports some tingling numbness and heat in his left calf, starting 3 days ago.  This is site of previous clotting.  The symptoms have slowly abated but are still present.  He says that walking helps. He has not missed any doses of XArelto.    He DENIES SOB, Chest pain, any hard chord of pain in calf and increased pain with dorsiflexion.    Discussed with Zoila Chan PA-C and left voicemail message for patient saying that the chance of a clot is small, the resolving of symptoms is a good sign, and our staff is available to order imaging if the symptoms persist or worsen.    He has our phone number to call back if need be.    Christin Zambrano, RN - Nurse Clinician - [facility] - [phone]

## 2020-12-06 ENCOUNTER — HEALTH MAINTENANCE LETTER (OUTPATIENT)
Age: 40
End: 2020-12-06

## 2021-05-24 ENCOUNTER — VIRTUAL VISIT (OUTPATIENT)
Dept: HEMATOLOGY | Facility: CLINIC | Age: 41
End: 2021-05-24
Attending: PHYSICIAN ASSISTANT
Payer: COMMERCIAL

## 2021-05-24 VITALS — BODY MASS INDEX: 35.74 KG/M2 | WEIGHT: 235 LBS

## 2021-05-24 DIAGNOSIS — Z86.718 PERSONAL HISTORY OF DVT (DEEP VEIN THROMBOSIS): ICD-10-CM

## 2021-05-24 DIAGNOSIS — Z79.01 CHRONIC ANTICOAGULATION: ICD-10-CM

## 2021-05-24 PROCEDURE — 99213 OFFICE O/P EST LOW 20 MIN: CPT | Mod: GT | Performed by: PHYSICIAN ASSISTANT

## 2021-05-24 RX ORDER — FLUOXETINE 10 MG/1
10 CAPSULE ORAL
COMMUNITY
Start: 2021-04-01

## 2021-05-24 NOTE — PROGRESS NOTES
[facility]  [facility]  [facility], [address], [place] [address]  Main: [phone], Fax: [phone]        Patient: Harish Carr  MRN: [id number]  : 1980  KIMBERLY: 2021  Video visit done, patient accessed through [employer] at home. Provider at home. Start time 3:00pm ,end time 3:20pm    Reason:  History of left lower extremity DVT (19).    HPI:  aHrish is a 41 yo male who noted left calf pain around 19 and then had evaluation of the leg on 19. On ultrasound he was found to have an occlusive left tibial vein clot, not extending above the knee.  He had no shortness of breath or chest pain thus no CT was done.  He was started on Xarelto 20mg daily and has been taking this every day.  His leg symptoms have improved but not resolved.  He denies significant swelling in the leg.     He denies any related injury, no surgery, no periods of immobilization.  He does state he has an extensive family history of deep vein thrombosis notable for his mother who is on long term anticoagulation with warfarin.  Some of her clotting events have occurred on anticoagulation per her report and she is negative for factor V Leiden/Prothrombin gene mutation.  No other thrombophilia testing was done on mother.      ROS:  On anticoagulation he denies any bleeding issues. Denies any ecchymosis. Denies any lower extremity swelling, but still some pain in left calf. Denies any fever, no chest pain. Denies any shortness of breath.  This past year has been challenging in regard to anxiety which has resulted in increased migraine headaches of Harish.  He has been started on Prozac and feels this is helping him.  He did not get ill with COVID this past year and is now fully immunized.  He did have some concern for thrombosis after long road trip in 2020 and US of left leg was done which was negative for clot.      Past Medical  "History:  Past Medical History:   Diagnosis Date     Asthma        Past Surgical History:  Past Surgical History:   Procedure Laterality Date     SURGICAL HISTORY OF -   2001    hair transplant       Medications:  Current Outpatient Medications   Medication Sig Dispense Refill     acyclovir (ZOVIRAX) 400 MG tablet Take 1 tablet by mouth 3 times daily. 15 tablet 3     albuterol (PROVENTIL HFA: VENTOLIN HFA) 108 (90 BASE) MCG/ACT inhaler Inhale 2 puffs into the lungs every 6 hours as needed for shortness of breath / dyspnea. 1 Inhaler 1     CLARITIN 10 MG OR TABS 1 TABLET DAILY       COMPRESSION STOCKINGS Bilateral knee high graduated compression stockings 20-30mmHg 4 each 1     FLUoxetine (PROZAC) 10 MG capsule Take 10 mg by mouth       fluticasone (FLONASE) 50 MCG/ACT nasal spray 1-2 sprays by Both Nostrils route daily. 3 Package 1     LANsoprazole (PREVACID) 30 MG capsule Take 1 capsule by mouth daily. 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  Notes his job is stressful.    Family History:  Extensive maternal family history.  Mother with repeated clotting events and is on long term anticoagulation.  Has one younger sister who has not had clotting issues - although reports \"mini-stroke\" at time of childbirth.  Has 2 maternal aunts with clotting and maternal grandmother with clotting issues.  Does not recall any of them being diagnosed with a thrombophilia.    Harish is here today for follow-up with mother and they met with genetics counselor and family history was reviewed and outlined.    Objectives:  Pleasant 38 year old male in no acute distress.  Wt 106.6 kg (235 lb)   BMI 35.74 kg/m    Exam:   Evaluation of lower legs bilaterally does not show swelling in " the left calf, no edema, no warmth, no tenderness.    Labs:  3/9/2021 Cr: 1.07  No recent LFTs    Thrombophilia labs done off anticoagulation briefly      Ref. Range 3/6/2020 08:35   PTT Latest Ref Range: 22 - 37 sec 28   Antithrombin III Chromogenic Latest Ref Range: 85 - 135 % 107   Lupus Result Latest Ref Range: NEG^Negative  Negative   Prot C Chromogenic Latest Ref Range: 70 - 170 % 100   Protein S Free Latest Ref Range: 70 - 148 % 165 (H)  Not a risk for clotting   Cardiolipin Antibody IgG Latest Ref Range: 0.0 - 19.9 GPL-U/mL <1.6   Cardiolipin Antibody IgM Latest Ref Range: 0.0 - 19.9 MPL-U/mL <0.2   Beta 2 Glycoprotein 1 Antibody IgG Latest Ref Range: <7 U/mL <0.6   Beta 2 Glycoprotein 1 Antibody IgM Latest Ref Range: <7 U/mL <2.9      Results for ROLANDO SETHI (MRN [id number]) as of 3/12/2020 14:06    Ref. Range 3/6/2020 08:35   FACTOR 2 AND 5 MUTATION ANALYSIS Unknown Negative for both     Imaging:  Ultrasound of left le19 Positive for occlusive thrombus in one of the posterior tibial veins of the proximal to mid left calf.    1/15/20:  The common femoral, greater saphenous origin, femoral, popliteal, and  deep calf veins are visualized and are patent. Venous waveforms are  normal. There is normal response to compression.     2020:  No deep vein thrombosis in the left lower extremity.    Assessment:  Rolando is a 41 yo male with unprovoked distal left lower extremity DVT (19) which has now resolved.  He has extensive maternal family history of DVT.  No known thrombophilia in family.     Discussed that given his extensive family history and that his clotting event, while in a distal vein, was unprovoked we recommend longer term anticoagulation.  He is in agreement with this plan.  Thrombophilia testing done 3/2020 on Rolando was negative.   He is doing well on Xarelto 20mg daily.      Plan:  1. Continue with Xarelto 20mg daily.  Rx written for one year.  2. Return to  clinic annually for evaluation and discussion of risks vs/ benefit of longer term anticoagulation.  3. Compression stocking for comfort and periods of immobility.    4.  Check renal and hepatic function annually    Patient understands and agrees with the above plan and recommendation.    Total Time Spent:  20 minutes, all 20 minutes was spent on video consultation of the patient and coordination of care in regard to recent clotting event and treatment plan.          Zoila Chan MPH, PA-C  [facility]  [facility]  [phone] main line  [phone] pager  [phone] fax

## 2021-05-24 NOTE — PROGRESS NOTES
Patient was contacted to complete the pre-visit call prior to their video visit with the provider.      Allergies and medications were reviewed.    I thanked them for their time to cover this information     Varghese Gaytan CMA

## 2021-05-25 DIAGNOSIS — Z79.01 CHRONIC ANTICOAGULATION: ICD-10-CM

## 2021-05-25 DIAGNOSIS — Z86.718 PERSONAL HISTORY OF DVT (DEEP VEIN THROMBOSIS): ICD-10-CM

## 2021-05-25 RX ORDER — RIVAROXABAN 20 MG/1
TABLET, FILM COATED ORAL
Qty: 30 TABLET | Refills: 0 | Status: SHIPPED | OUTPATIENT
Start: 2021-05-25 | End: 2021-05-28

## 2021-05-28 DIAGNOSIS — Z86.718 PERSONAL HISTORY OF DVT (DEEP VEIN THROMBOSIS): ICD-10-CM

## 2021-05-28 DIAGNOSIS — Z79.01 CHRONIC ANTICOAGULATION: ICD-10-CM

## 2021-05-28 RX ORDER — RIVAROXABAN 20 MG/1
TABLET, FILM COATED ORAL
Qty: 30 TABLET | Refills: 0 | Status: SHIPPED | OUTPATIENT
Start: 2021-05-28 | End: 2022-03-31

## 2021-09-25 ENCOUNTER — HEALTH MAINTENANCE LETTER (OUTPATIENT)
Age: 41
End: 2021-09-25

## 2022-01-15 ENCOUNTER — HEALTH MAINTENANCE LETTER (OUTPATIENT)
Age: 42
End: 2022-01-15

## 2022-01-27 ENCOUNTER — TELEPHONE (OUTPATIENT)
Dept: HEMATOLOGY | Facility: CLINIC | Age: 42
End: 2022-01-27
Payer: COMMERCIAL

## 2022-01-27 NOTE — TELEPHONE ENCOUNTER
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  He is having and colonoscopy in April at [facility] and they are requesting a 2 day hold of Xarelto.  Reviewed per Zoila Chan PA-C who saw him in May 2021.  Ok to hold Xarelto for 2 days prior and day of procedure, resuming day following.    Lino also reported a lump in his forearm which occurred spontaneously about 2 weeks ago.  After a work out, the lump was gone and there was bruising at the site.  This resolved after about 7 days.  Discussed that this was not overly worrisome and he should call us if this happens again for evaluation.    Christin ALDRIDGEN, RN   Nurse Clinician  [facility]  [facility]  Office: [phone]  Main Office: [phone] (ask to speak with a nurse)  Fax: [phone]

## 2022-03-31 DIAGNOSIS — Z86.718 PERSONAL HISTORY OF DVT (DEEP VEIN THROMBOSIS): ICD-10-CM

## 2022-03-31 DIAGNOSIS — Z79.01 CHRONIC ANTICOAGULATION: ICD-10-CM

## 2022-03-31 NOTE — TELEPHONE ENCOUNTER
Patient is coming due for a refill. Patient has an appointment coming up with Dr. Newman on 4/27/22. Thanks!

## 2022-04-25 NOTE — PROGRESS NOTES
[facility]  [address] [place] [address]  Main: [phone], Fax: [phone]    Patient seen at: [facility] at [address] [place] [address]    Outpatient Visit Note:    Patient: Harish Gamboa  MRN: [id number]  : 1980  KIMBERLY: 2022    Reason for visit:  Follow up, history of unprovoked lower extremity DVT    Clinical History Summary:   Harish is a 41 year old male with past medical history significant for unprovoked distal occlusive left lower extremity DVT in 2019. He was started on anticoagulation. Following up imaging showed resolution of thrombus. He has extensive maternal family history of DVT without identified thrombophilia. His thrombophilia testing was completed on 3/2020 and was unremarkable. He has been maintained on Xarelto 20mg once a day.     Interim History:  Lino reports that he has been doing very well. He notes no concerns on the Xarelto. He has not had any further VTE events nor any bleeding complications. He did have a colonoscopy with precancerous polyp removal this year. No bleeding issues after the procedure resuming his Xarelto. He notes occasional lower extremity edema after increased activity. No lower extremity pain or wounds. He has no upcoming surgeries or procedures.     ROS:  Denies any bleeding complications. Specifically, no frequent epistaxis. No issues with oral mucosal bleeding. Denies any hematuria or blood in stools. Denies any shortness of breath. No chest pain. No cough. No fever.    Medication, Allergies and PmHx:  All have been reviewed by this writer in the electronic medical records.    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   Mother with repeated clotting events on long term anticoagulation.   Sister with mini strokes at time of childbirth   2 maternal aunts and maternal grandmother with clotting issues  "      Objective:  Vitals: /70   Pulse 79   Temp 97.7  F (36.5  C) (Oral)   Resp 16   Ht 1.753 m (5' 9\")   Wt 115.2 kg (254 lb)   SpO2 96%   BMI 37.51 kg/m     Exam:   Constitutional: Appears well, in no acute distress  HEENT: Pupils equal and round   Resp: normal work of breathing   Skin: no petechiae, purpura or ecchymosis on exposed skin  Neuro: CN II-XII intact. Normal gait. AOx3        Labs:  CBC 3/2021   3.5 - 10.5 x10(9)/L 7.7    RBC 4.32 - 5.72 x10(12)/L 5.31    Hemoglobin 13.5 - 17.5 g/dL 15.1    HCT 38.8 - 50.0 % 46.0    MCV 80.0 - 100.0 fL 86.6    MCH 27.6 - 33.3 pg 28.4    MCHC 31.5 - 35.2 g/dL 32.8    RDW 11.9 - 15.5 % 12.9    Platelets 150 - 450 x10(9)/L 216    Automated NRBC <=0 /100 WBC 0    Neutrophil Absolute 1.7 - 7.0 10(9)/L 3.8    Lymphocyte Absolute 1.0 - 4.8 10(9)/L 3.0    Monocytes Absolute 0.2 - 0.9 10(9)/L 0.6    Eosinophil Absolute 0.0 - 0.5 10(9)/L 0.1    Basophil Absolute 0.0 - 0.3 10(9)/L 0.0    Immature Gran % 0.0 - 0.5 % 0.0        Liver Function Studies -   Recent Labs   Lab Test 20  0835   PROTTOTAL 7.5   ALBUMIN 4.2   BILITOTAL 0.6   ALKPHOS 65   AST 23   ALT 41      Latest Reference Range & Units 20 08:35   PTT 22 - 37 sec 28   Antithrombin III Chromogenic 85 - 135 % 107   Lupus Result NEG^Negative  Negative [1]   Prot C Chromogenic 70 - 170 % 100   Protein S Free 70 - 148 % 165 (H) [2]   LUPUS ANTICOAGULANT PANEL  Rpt   Cardiolipin Antibody IgG 0.0 - 19.9 GPL-U/mL <1.6 [3]   Cardiolipin Antibody IgM 0.0 - 19.9 MPL-U/mL <0.2 [4]   Beta 2 Glycoprotein 1 Antibody IgG <7 U/mL <0.6 [5]   Beta 2 Glycoprotein 1 Antibody IgM <7 U/mL <2.9 [6]   FACTOR 2 AND 5 MUTATION ANALYSIS  Neg       Imagin19:   Positive for occlusive thrombus in one of the posterior tibial veins of the proximal to mid left calf.     1/15/20:  The common femoral, greater saphenous origin, femoral, popliteal, and  deep calf veins are visualized and are patent. Venous waveforms " are  normal. There is normal response to compression.     7/2/2020:  No deep vein thrombosis in the left lower extremity.    Assessment:  In summary, Lino is a 41 year old man with history of unprovoked distal DVT of left lower extremity in 9/2019 that has now resolved. Due to his extensive maternal family history of clotting events, we elected to keep him on long term anticoagulation. With Xarelto, he has not had any further thromboembolic events nor bleeding events.     Diagnosis:   1. Unprovoked DVT 2019, on chronic anticoagulation with Xarelto 20mg once daily   2. Family history of VTE, maternal side    Plan:        Lino remains a good candidate for long term anticoagulation.     Prescription renewed for Xarelto 20mg once daily.     Compression stockings ordered    CBC, Creat and LFTs annually. Will order these to be done at his annual appointment with PCP.    Instructed him to contact the clinic with any scheduled procedures for further instructions on holding anticoagulation.       20 minutes spent on the date of the encounter doing chart review, review of outside records, review of test results, interpretation of tests, patient visit and documentation          Nissa Torrez, MPH, PA-C  [facility]

## 2022-04-27 ENCOUNTER — OFFICE VISIT (OUTPATIENT)
Dept: HEMATOLOGY | Facility: CLINIC | Age: 42
End: 2022-04-27
Attending: INTERNAL MEDICINE
Payer: COMMERCIAL

## 2022-04-27 VITALS
HEIGHT: 69 IN | SYSTOLIC BLOOD PRESSURE: 136 MMHG | WEIGHT: 254 LBS | BODY MASS INDEX: 37.62 KG/M2 | DIASTOLIC BLOOD PRESSURE: 70 MMHG | HEART RATE: 79 BPM | RESPIRATION RATE: 16 BRPM | OXYGEN SATURATION: 96 % | TEMPERATURE: 97.7 F

## 2022-04-27 DIAGNOSIS — Z86.718 PERSONAL HISTORY OF DVT (DEEP VEIN THROMBOSIS): Primary | ICD-10-CM

## 2022-04-27 DIAGNOSIS — Z79.01 CHRONIC ANTICOAGULATION: ICD-10-CM

## 2022-04-27 PROCEDURE — G0463 HOSPITAL OUTPT CLINIC VISIT: HCPCS

## 2022-04-27 PROCEDURE — 99213 OFFICE O/P EST LOW 20 MIN: CPT | Performed by: PHYSICIAN ASSISTANT

## 2022-04-27 RX ORDER — TOPIRAMATE 25 MG/1
25 TABLET, FILM COATED ORAL
COMMUNITY
Start: 2021-04-01

## 2022-04-27 RX ORDER — SUMATRIPTAN 100 MG/1
TABLET, FILM COATED ORAL
COMMUNITY
Start: 2022-03-24

## 2022-04-27 ASSESSMENT — PAIN SCALES - GENERAL: PAINLEVEL: NO PAIN (0)

## 2022-04-27 NOTE — PATIENT INSTRUCTIONS
[facility]  [facility] [address], [place] [address]  Main: [phone], Fax: [phone]    Harish,  It was a pleasure seeing you today.  Thank you for allowing us to be involved in your care.  Please let us know if there is anything else we can do for you, so that we can be sure you are leaving completely satisfied with your care experience.    Labs to be done at your annual primary care check    Please stay on your blood thinner:  Xarelto 20mg   Please call us with any bleeding issues that you may have.    Wear compression stockings if you have swelling in your leg. Take them off while you are sleeping. You may need to get new stockings every 3-6 months with regular wear.    Patient Education & Resources:  For additional information, please see the following web links:  www.stoptheclot.org, www.clotconnect.org.    Call the [facility]  at [phone].     -If surgeries or procedures are planned (for holding instructions).     -If off anticoagulation, please call during high risk times (long-distance travel, broken bones or trauma, immobilization, surgery, pregnancy, or taking estrogen).     -Any new symptoms of DVT (deep vein thrombosis) or PE (pulmonary embolism)    -pain     -swelling     -redness    -warmth    -shortness of breath    -chest pain    -coughing up blood    We would like a provider on our team to see you at least annually for optimal care and to allow us to continue to prescribe for you.     Return to clinic in 1 year     Your nurse clinician is Mayuri Millan, MSN, RN, [phone].   If they are unavailable and you have immediate concerns, please call [phone] and ask for a nurse.         Nissa Torrez, MPH, PA-C  [facility]

## 2022-06-08 ENCOUNTER — TELEPHONE (OUTPATIENT)
Dept: HEMATOLOGY | Facility: CLINIC | Age: 42
End: 2022-06-08
Payer: COMMERCIAL

## 2022-06-08 DIAGNOSIS — Z86.718 PERSONAL HISTORY OF DVT (DEEP VEIN THROMBOSIS): Primary | ICD-10-CM

## 2022-06-09 NOTE — TELEPHONE ENCOUNTER
Harish Gamboa has history of unprovoked left lower extremity clotting and is on long term anticoagulation with Xarelto 20 mg daily.  He was last [facility] in the [facility] at [facility] by Nissa Claire PA-C on 4.25.2022.    He is calling today with 10 day history of left calf pain, which he says is reminiscent of past clotting.  He says that he is taking his Xarelto consistently.  He reports possible overuse of the leg with gardening. He has also been sitting a lot with his work He was concerned about interaction with Peabody's wort which he took for a short time.  (Did tell him that use of this supplement  was most likely not a issue). He has used compression stockings over the past several day,  which have helped reduce the symptoms in his leg.  He is planning a trip to [place] on 6.13.2022 and wanted to contact our center in advance of that.    Discussed with Nissa Cliare PA-C who placed and order for a left lower extremity US.  Lino got this scheduled for 6.10.2022.  Center staff will reach out when that study is completed.    Christin ALDRIDGEN, RN   Nurse Clinician  [facility] for [facility]  Office: [phone]  Main Office: [phone] (ask to speak with a nurse)  Fax: [phone]    Addendum 6/10/22    RN called Lino regarding ultrasound results. Per imaging report:    IMPRESSION: No deep venous thrombosis demonstrated in the LEFT leg.    Lino was very happy to hear this and has our contact information for any further questions, comments or concerns.     Mona Veras  MSN, RN, PHN  [facility]  Office: [phone]  Fax: [phone]

## 2022-06-10 ENCOUNTER — ANCILLARY PROCEDURE (OUTPATIENT)
Dept: ULTRASOUND IMAGING | Facility: CLINIC | Age: 42
End: 2022-06-10
Attending: PHYSICIAN ASSISTANT
Payer: COMMERCIAL

## 2022-06-10 DIAGNOSIS — Z86.718 PERSONAL HISTORY OF DVT (DEEP VEIN THROMBOSIS): ICD-10-CM

## 2022-06-10 PROCEDURE — 93971 EXTREMITY STUDY: CPT | Mod: LT | Performed by: RADIOLOGY

## 2022-11-29 ENCOUNTER — MYC MEDICAL ADVICE (OUTPATIENT)
Dept: HEMATOLOGY | Facility: CLINIC | Age: 42
End: 2022-11-29

## 2022-11-30 NOTE — TELEPHONE ENCOUNTER
Phone call received from Harish Gamboa about request for work place accomodation.  He would like to continue to work remote due to risk of COVID in his work place.  Lino sent paper work which was completed, signed by provider and emailed back to him.      Chrisitn ALDRIDGEN, RN   Nurse Clinician  [facility] for [facility]  Office: [phone]  Main Office: [phone] (ask to speak with a nurse)  Fax: [phone]

## 2023-04-22 ENCOUNTER — HEALTH MAINTENANCE LETTER (OUTPATIENT)
Age: 43
End: 2023-04-22

## 2023-04-24 NOTE — PROGRESS NOTES
[facility]  [address] [place] [address]  Main: [phone], Fax: [phone]    Patient seen at: [facility] at [address] [place] [address]    Outpatient Visit Note:    Patient: Harish Gamboa  MRN: [id number]  : 1980  KIMBERLY: 2023    Reason for visit:  Follow up, history of unprovoked lower extremity DVT     Clinical History Summary:  Harish Gamboa is a 42 year old male with past medical history significant for unprovoked distal occlusive left lower extremity DVT in 2019. He was started on anticoagulation. Following up imaging showed resolution of thrombus. He has extensive maternal family history of DVT without identified thrombophilia. His thrombophilia testing was completed on 3/2020 and was unremarkable. He has been maintained on Xarelto 20mg once a day for secondary prevention of venous thromboembolism.     Interim History:  Lino reports that he has been doing very well. He notes no concerns on the Xarelto. He has not had any further VTE events nor any bleeding complications. He endorses no lower extremity edema or pain. He has not been routinely wearing compression unless he is traveling. He did not have any surgeries or procedures this last year and has nothing planned except possible dermatology biopsy.      ROS:  Denies any bleeding complications. Specifically, no frequent epistaxis. No issues with oral mucosal bleeding. Denies any hematuria or blood in stools. Denies any shortness of breath. No chest pain. No cough. No fever.       Medications:   Current Outpatient Medications   Medication Sig Dispense Refill     buPROPion (WELLBUTRIN) 75 MG tablet Take 50 mg by mouth daily       rivaroxaban ANTICOAGULANT (XARELTO ANTICOAGULANT) 20 MG TABS tablet Take 1 tablet (20 mg) by mouth daily with food 90 tablet 3     albuterol (PROVENTIL HFA: VENTOLIN HFA) 108 (90 BASE) MCG/ACT inhaler  "Inhale 2 puffs into the lungs every 6 hours as needed for shortness of breath / dyspnea. 1 Inhaler 1     CLARITIN 10 MG OR TABS 1 TABLET DAILY       COMPRESSION STOCKINGS Bilateral knee high graduated compression stockings 20-30mmHg 4 each 1     FLUoxetine (PROZAC) 10 MG capsule Take 10 mg by mouth       fluticasone (FLONASE) 50 MCG/ACT nasal spray 1-2 sprays by Both Nostrils route daily. 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     Family History:  Mother with repeated clotting events on long term anticoagulation. Also has atrial fibrillation.   Sister with mini strokes at time of childbirth   2 maternal aunts and maternal grandmother with clotting issues     Objective:  Vitals: BP (!) 143/90 (BP Location: Right arm, Patient Position: Sitting, Cuff Size: Adult Regular)   Pulse 81   Temp 97.6  F (36.4  C) (Oral)   Resp 18   Ht 1.727 m (5' 8\")   Wt 112.9 kg (249 lb)   SpO2 98%   BMI 37.86 kg/m       Exam:   Constitutional: Appears well, no distress  HEENT: Pupils equal and round. No scleral icterus or hemorrhage.   Respiratory: no increased work of breathing.   Mus/Skele: no edema of lower extremities  Skin: no petechiae, no ecchymosis on exposed dermis.  Neuro: CN II-XII intact. Normal gait. AOx3      Labs:  CBC RESULTS: No results for input(s): WBC, RBC, HGB, HCT, MCV, MCH, MCHC, RDW, PLT in the last 78663 hours.  Last Comprehensive Metabolic Panel:  Sodium   Date Value Ref Range Status   03/06/2020 141 133 - 144 mmol/L Final     Potassium   Date Value Ref Range Status   03/06/2020 4.2 3.4 - 5.3 " mmol/L Final     Chloride   Date Value Ref Range Status   2020 109 94 - 109 mmol/L Final     Carbon Dioxide   Date Value Ref Range Status   2020 27 20 - 32 mmol/L Final     Anion Gap   Date Value Ref Range Status   2020 5 3 - 14 mmol/L Final     Glucose   Date Value Ref Range Status   2020 97 70 - 99 mg/dL Final     Urea Nitrogen   Date Value Ref Range Status   2020 13 7 - 30 mg/dL Final     Creatinine   Date Value Ref Range Status   2020 0.96 0.66 - 1.25 mg/dL Final     GFR Estimate   Date Value Ref Range Status   2020 >90 >60 mL/min/[1.73_m2] Final     Comment:     Non  GFR Calc  Starting 2018, serum creatinine based estimated GFR (eGFR) will be   calculated using the Chronic Kidney Disease Epidemiology Collaboration   (CKD-EPI) equation.       Calcium   Date Value Ref Range Status   2020 9.1 8.5 - 10.1 mg/dL Final     Liver Function Studies -   Recent Labs   Lab Test 20  0835   PROTTOTAL 7.5   ALBUMIN 4.2   BILITOTAL 0.6   ALKPHOS 65   AST 23   ALT 41          Latest Reference Range & Units 20 08:35   PTT 22 - 37 sec 28   Antithrombin III Chromogenic 85 - 135 % 107   Lupus Result NEG^Negative  Negative [1]   Prot C Chromogenic 70 - 170 % 100   Protein S Free 70 - 148 % 165 (H) [2]   LUPUS ANTICOAGULANT PANEL   Rpt   Cardiolipin Antibody IgG 0.0 - 19.9 GPL-U/mL <1.6 [3]   Cardiolipin Antibody IgM 0.0 - 19.9 MPL-U/mL <0.2 [4]   Beta 2 Glycoprotein 1 Antibody IgG <7 U/mL <0.6 [5]   Beta 2 Glycoprotein 1 Antibody IgM <7 U/mL <2.9 [6]   FACTOR 2 AND 5 MUTATION ANALYSIS   Neg          Imagin19:   Positive for occlusive thrombus in one of the posterior tibial veins of the proximal to mid left calf.     1/15/20:  The common femoral, greater saphenous origin, femoral, popliteal, and  deep calf veins are visualized and are patent. Venous waveforms are  normal. There is normal response to compression.     2020:  No deep vein  thrombosis in the left lower extremity.     Assessment:  In summary, Lino is a 42  year old man with history of unprovoked distal DVT of left lower extremity in 9/2019 that has now resolved. Due to his extensive maternal family history of clotting events, we elected to keep him on long term anticoagulation. With Xarelto, he has not had any further thromboembolic events nor bleeding events.      Diagnosis:   1. Unprovoked DVT 2019, on chronic anticoagulation with Xarelto 20mg once daily   2. Family history of VTE, maternal side  - negative thrombophilia evaluation   3. Family history of atrial fibrillation        Plan:  Lino remains a good candidate for long term anticoagulation.     Prescription renewed for Xarelto 20mg once daily. We did discuss reduction to prophylactic intensity antcioagulation however he is tolerating therapeutic dose well and he does have a family history of atrial fibrillation thus will continue on current dose.     CBC, CMP annually. Last labs done at [facility] 7/2022     Instructed him to contact the clinic with any scheduled procedures for further instructions on holding anticoagulation.     Patient instructed to contact the clinic should they require any surgical procedures for perioperative planning.     Return to clinic in 1 year, sooner if any concerns.       Nissa Torrez, MPH, PA-C  [facility] for [facility]    20 minutes spent by me on the date of the encounter doing chart review, review of test results, interpretation of tests, patient visit and documentation

## 2023-04-27 ENCOUNTER — OFFICE VISIT (OUTPATIENT)
Dept: HEMATOLOGY | Facility: CLINIC | Age: 43
End: 2023-04-27
Attending: PHYSICIAN ASSISTANT
Payer: COMMERCIAL

## 2023-04-27 VITALS
OXYGEN SATURATION: 98 % | WEIGHT: 249 LBS | RESPIRATION RATE: 18 BRPM | HEART RATE: 81 BPM | HEIGHT: 68 IN | DIASTOLIC BLOOD PRESSURE: 90 MMHG | SYSTOLIC BLOOD PRESSURE: 143 MMHG | BODY MASS INDEX: 37.74 KG/M2 | TEMPERATURE: 97.6 F

## 2023-04-27 DIAGNOSIS — Z86.718 PERSONAL HISTORY OF DVT (DEEP VEIN THROMBOSIS): ICD-10-CM

## 2023-04-27 DIAGNOSIS — Z79.01 CHRONIC ANTICOAGULATION: ICD-10-CM

## 2023-04-27 PROCEDURE — G0463 HOSPITAL OUTPT CLINIC VISIT: HCPCS | Performed by: PHYSICIAN ASSISTANT

## 2023-04-27 PROCEDURE — 99213 OFFICE O/P EST LOW 20 MIN: CPT | Performed by: PHYSICIAN ASSISTANT

## 2023-04-27 RX ORDER — BUPROPION HYDROCHLORIDE 75 MG/1
50 TABLET ORAL DAILY
COMMUNITY
End: 2024-05-01

## 2023-04-27 NOTE — PATIENT INSTRUCTIONS
[facility]  [facility]  [facility] [address], [address], [place] [address]  Main: [phone], Fax: [phone]    Harish,   It was a pleasure seeing you today.  Thank you for allowing us to be involved in your care.  Please let us know if there is anything else we can do for you, so that we can be sure you are leaving completely satisfied with your care experience. Below is the plan that we discussed.     Continue Xarelto 20mg once daily with food   OK to hold a dose prior to dermatology appointment   Consider getting an OMRON blood pressure cuff that goes on upper arm. You may need a large cuff size. Bring to your primary care appointment for calibration assessment. Take BP reading a few times per week at similar time of day - not right after exercise, meal or coffee in a relaxed environment.      We would like a provider on our team to see you at least annually for optimal care and to allow us to continue to prescribe for you.      Return to clinic in  1 year    Your nurse clinician is Mayuri Millan, MSN, RN, [phone].   If they are unavailable and you have immediate concerns, please call [phone] and ask for a nurse.     Nissa Torrez, MPH, PA-C  [facility] for Ble[facility] and Clot[facility] Disorders

## 2024-04-15 DIAGNOSIS — Z79.01 CHRONIC ANTICOAGULATION: ICD-10-CM

## 2024-04-15 DIAGNOSIS — Z86.718 PERSONAL HISTORY OF DVT (DEEP VEIN THROMBOSIS): ICD-10-CM

## 2024-04-15 RX ORDER — RIVAROXABAN 20 MG/1
20 TABLET, FILM COATED ORAL
Qty: 90 TABLET | Refills: 0 | Status: SHIPPED | OUTPATIENT
Start: 2024-04-15 | End: 2024-05-01

## 2024-04-29 NOTE — PROGRESS NOTES
[facility]  [address] [place] [address] [address], [place] [address]  Main: [phone], Fax: [phone]    Patient seen at: [facility] at [address] [place] [address]    Outpatient Visit Note:    Patient: Harish Gamboa  MRN: [id number]  : 1980  KIMBERLY: May 1, 2024    Reason for visit:  Follow up, history of unprovoked lower extremity DVT on long term anticoagulation.     Clinical History Summary:  Harish Gamboa is a 43 year old male with past medical history significant for unprovoked distal occlusive left lower extremity DVT in 2019. He was started on anticoagulation. Following up imaging showed resolution of thrombus. He has extensive maternal family history of DVT without identified thrombophilia. His thrombophilia testing was completed on 3/2020 and was unremarkable. He has been maintained on Xarelto 20mg once a day for secondary prevention of venous thromboembolism.      Interim History:  Lino reports that he has been doing very well. He notes no concerns on the Xarelto. He has not had any further VTE events nor any bleeding complications. He endorses no lower extremity edema or pain. He has not been routinely wearing compression unless he is traveling. He did not have any surgeries or procedures this last year and has none scheduled. He did have labs at [facility] in 2024 that were normal.      ROS:  Denies any bleeding complications. Specifically, no frequent epistaxis. No issues with oral mucosal bleeding. Denies any hematuria or blood in stools. Denies any shortness of breath. No chest pain. No cough. No fever. No leg pain or swelling.       Medications:   Current Outpatient Medications   Medication Sig Dispense Refill    albuterol (PROVENTIL HFA: VENTOLIN HFA) 108 (90 BASE) MCG/ACT inhaler Inhale 2 puffs into the lungs every 6 hours as needed for shortness of breath / dyspnea. 1 Inhaler 1    CLARITIN 10 MG  "OR TABS 1 TABLET DAILY      COMPRESSION STOCKINGS Bilateral knee high graduated compression stockings 20-30mmHg 4 each 1    FLUoxetine (PROZAC) 10 MG capsule Take 10 mg by mouth      fluticasone (FLONASE) 50 MCG/ACT nasal spray 1-2 sprays by Both Nostrils route daily. 3 Package 1    rivaroxaban ANTICOAGULANT (XARELTO ANTICOAGULANT) 20 MG TABS tablet Take 1 tablet (20 mg) by mouth daily with food 90 tablet 3    SUMAtriptan (IMITREX) 100 MG tablet       topiramate (TOPAMAX) 25 MG tablet Take 25 mg by mouth          Allergies:    No Known Allergies    PMH:  Past Medical History:   Diagnosis Date    Asthma         Social History:   Social History     Tobacco Use    Smoking status: Never    Smokeless tobacco: Never   Substance Use Topics    Alcohol use: No     Comment: none for 5 years     Drug use: No       Family History:  Deferred.    Objective:  Vitals: BP (!) 145/88   Pulse 64   Temp 98.4  F (36.9  C) (Oral)   Ht 1.727 m (5' 8\")   Wt 110.5 kg (243 lb 11.2 oz)   SpO2 99%   BMI 37.05 kg/m       Exam:   Constitutional: Appears well, no distress  HEENT: Pupils equal and round. No scleral icterus or hemorrhage.   Respiratory: no increased work of breathing.   Mus/Skele: no edema of lower extremities  Skin: no petechiae, no ecchymosis on exposed dermis.  Neuro: CN II-XII intact. Normal gait. AOx3      Labs:    Latest Reference Range & Units 20 08:35   PTT 22 - 37 sec 28   Antithrombin III Chromogenic 85 - 135 % 107   Lupus Result NEG^Negative  Negative [1]   Prot C Chromogenic 70 - 170 % 100   Protein S Free 70 - 148 % 165 (H) [2]   LUPUS ANTICOAGULANT PANEL   Rpt   Cardiolipin Antibody IgG 0.0 - 19.9 GPL-U/mL <1.6 [3]   Cardiolipin Antibody IgM 0.0 - 19.9 MPL-U/mL <0.2 [4]   Beta 2 Glycoprotein 1 Antibody IgG <7 U/mL <0.6 [5]   Beta 2 Glycoprotein 1 Antibody IgM <7 U/mL <2.9 [6]   FACTOR 2 AND 5 MUTATION ANALYSIS   Neg            Imagin19:   Positive for occlusive thrombus in one of the posterior " tibial veins of the proximal to mid left calf.     1/15/20:  The common femoral, greater saphenous origin, femoral, popliteal, and  deep calf veins are visualized and are patent. Venous waveforms are  normal. There is normal response to compression.     7/2/2020:  No deep vein thrombosis in the left lower extremity.     Assessment:  In summary, Lino is a 42  year old man with history of unprovoked distal DVT of left lower extremity in 9/2019 that has now resolved. Due to his extensive maternal family history of clotting events, we elected to keep him on long term anticoagulation. With Xarelto, he has not had any further thromboembolic events nor bleeding events.      Diagnosis:   1. Unprovoked DVT 2019, on chronic anticoagulation with Xarelto 20mg once daily   2. Family history of VTE, maternal side  - negative thrombophilia evaluation   3. Family history of atrial fibrillation      Plan:  Lino remains a good candidate for long term anticoagulation.   Prescription renewed for Xarelto 20mg once daily. We did discuss reduction to prophylactic intensity antcioagulation however he is tolerating therapeutic dose well and he does have a family history of atrial fibrillation thus will continue on current dose.   CBC, CMP annually. Last labs done at [facility]   Instructed him to contact the clinic with any scheduled procedures for further instructions on holding anticoagulation.      Patient instructed to contact the clinic should they require any surgical procedures for perioperative planning.      Return to clinic in 1 year, sooner if any concerns.       Nissa Torrez, MPH, PA-C  [facility] for Bleeding and Clotting [facility]    10 minutes spent by me on the date of the encounter doing chart review, review of outside records, review of test results, interpretation of tests, patient visit, and documentation

## 2024-05-01 ENCOUNTER — OFFICE VISIT (OUTPATIENT)
Dept: HEMATOLOGY | Facility: CLINIC | Age: 44
End: 2024-05-01
Attending: PHYSICIAN ASSISTANT
Payer: COMMERCIAL

## 2024-05-01 VITALS
HEART RATE: 64 BPM | TEMPERATURE: 98.4 F | WEIGHT: 243.7 LBS | BODY MASS INDEX: 36.93 KG/M2 | HEIGHT: 68 IN | DIASTOLIC BLOOD PRESSURE: 88 MMHG | OXYGEN SATURATION: 99 % | SYSTOLIC BLOOD PRESSURE: 145 MMHG

## 2024-05-01 DIAGNOSIS — Z79.01 CHRONIC ANTICOAGULATION: ICD-10-CM

## 2024-05-01 DIAGNOSIS — Z86.718 PERSONAL HISTORY OF DVT (DEEP VEIN THROMBOSIS): Primary | ICD-10-CM

## 2024-05-01 PROCEDURE — 99213 OFFICE O/P EST LOW 20 MIN: CPT | Performed by: PHYSICIAN ASSISTANT

## 2024-05-01 NOTE — PATIENT INSTRUCTIONS
[facility]  [facility]  [facility] [address], [address], [place] [address]  Main: [phone], Fax: [phone]    Harish,   It was a pleasure seeing you today.  Thank you for allowing us to be involved in your care.  Please let us know if there is anything else we can do for you, so that we can be sure you are leaving completely satisfied with your care experience. Below is the plan that we discussed.     Stay on Xarelto 20mg once daily with food   Call if any concerns about new clotting symptoms or bleeding issues or if you plan to have any surgeries or procedures.   Annual labs at your primary care office.       We would like a provider on our team to see you at least annually for optimal care and to allow us to continue to prescribe for you.        Return to clinic in  in one year. (In office or virtual OK)    If you have questions or concerns, please don't hesitate to send a Octamer Message for non urgent matters or contact my nurse clinician, Herve. His number is [phone]. If they are unavailable and you have immediate concerns, please call [phone] and ask for a nurse.     Nissa Torrez, MPH, PA-C  [facility]

## 2024-06-29 ENCOUNTER — HEALTH MAINTENANCE LETTER (OUTPATIENT)
Age: 44
End: 2024-06-29

## 2025-03-10 ENCOUNTER — TELEPHONE (OUTPATIENT)
Dept: HEMATOLOGY | Facility: CLINIC | Age: 45
End: 2025-03-10
Payer: COMMERCIAL

## 2025-04-30 NOTE — PROGRESS NOTES
[facility]  [address] [place] [address] [address], [place] [address]  Main: [phone], Fax: [phone]    Patient seen at: [facility] at [address] [place] [address]    Outpatient Visit Note:    Patient: Harish Gamboa  MRN: [id number]  : 1980  KIMBERLY: 26     Reason for visit:  Follow up, long term anticoagulation for history of venous thromboembolism    Clinical History Summary:  Harish Gamboa is a 44 year old male with past medical history significant for unprovoked distal occlusive left lower extremity DVT in 2019. He was started on anticoagulation. Following up imaging showed resolution of thrombus. He has extensive maternal family history of DVT without identified thrombophilia. His thrombophilia testing was completed on 3/2020 and was unremarkable. He has been maintained on Xarelto 20mg once a day for secondary prevention of venous thromboembolism.      Interim History:  Today, Lino notes that he is doing well from a clotting perspective. He denies any recurrent venous thromboembolism symptoms nor bleeding issues. During his last refill his dose was changed to 10mg once daily. It appears that this was done by outside provider. This is a reasonable dose adjustment. He is tolerating it well. He did have a recent minor trauma to his right medial upper arm with ecchymosis that has since been improving. No hematoma formation. He denies any other bleeding concerns.     He did have surgery for suspected melanoma of right cheek with sentinel lymph node biopsy. Fortunately, it was not melanoma. He also had prostatic cyst requiring surgery. He has no other surgeries/procedures planned apart from colonoscopy in May.     His last labs were done in the fall. His last creat 1.3 in 2024. CBC WNL 10/2024.     He notes he was started on amitriptyline for headaches by primary care provider, wonders about interaction with  Xarelto.     ROS:  Denies any bleeding complications. Specifically, no frequent epistaxis. No issues with oral mucosal bleeding. Denies any hematuria or blood in stools. Denies any shortness of breath. No chest pain. No cough. No fever. No leg pain or swelling.      Medications:   Current Outpatient Medications   Medication Sig Dispense Refill    albuterol (PROVENTIL HFA: VENTOLIN HFA) 108 (90 BASE) MCG/ACT inhaler Inhale 2 puffs into the lungs every 6 hours as needed for shortness of breath / dyspnea. 1 Inhaler 1    amitriptyline (ELAVIL) 10 MG tablet Take 10 mg by mouth at bedtime.      buPROPion (WELLBUTRIN XL) 150 MG 24 hr tablet Take 1 tablet by mouth daily.      CLARITIN 10 MG OR TABS 1 TABLET DAILY      COMPRESSION STOCKINGS Bilateral knee high graduated compression stockings 20-30mmHg 4 each 1    FLUoxetine (PROZAC) 10 MG capsule Take 10 mg by mouth      fluticasone (FLONASE) 50 MCG/ACT nasal spray 1-2 sprays by Both Nostrils route daily. 3 Package 1    rivaroxaban ANTICOAGULANT (XARELTO ANTICOAGULANT) 20 MG TABS tablet Take 1 tablet (20 mg) by mouth daily with food 90 tablet 3    SUMAtriptan (IMITREX) 100 MG tablet       topiramate (TOPAMAX) 25 MG tablet Take 25 mg by mouth          Allergies:    No Known Allergies    PMH:  Past Medical History:   Diagnosis Date    Asthma         Social History:   Social History     Tobacco Use    Smoking status: Never    Smokeless tobacco: Never   Substance Use Topics    Alcohol use: No     Comment: none for 5 years     Drug use: No       Family History:  Deferred.    Objective:  Vitals: BP (!) 157/84 (BP Location: Right arm)   Pulse 94   Temp 97.1  F (36.2  C) (Tympanic)   Wt 114.7 kg (252 lb 12.8 oz)   SpO2 98%   BMI 38.44 kg/m       Exam:   Constitutional: Appears well, no distress  HEENT: Pupils equal and round. No scleral icterus or hemorrhage.   Respiratory: no increased work of breathing.   Mus/Skele: no edema of lower extremities  Skin: no petechiae, no  "ecchymosis on exposed dermis.  Neuro: CN II-XII intact. Normal gait. AOx3      Labs:  CBC RESULTS: No results for input(s): \"WBC\", \"RBC\", \"HGB\", \"HCT\", \"MCV\", \"MCH\", \"MCHC\", \"RDW\", \"PLT\" in the last 63632 hours.  Last Comprehensive Metabolic Panel:  Sodium   Date Value Ref Range Status   03/06/2020 141 133 - 144 mmol/L Final     Potassium   Date Value Ref Range Status   03/06/2020 4.2 3.4 - 5.3 mmol/L Final     Chloride   Date Value Ref Range Status   03/06/2020 109 94 - 109 mmol/L Final     Carbon Dioxide   Date Value Ref Range Status   03/06/2020 27 20 - 32 mmol/L Final     Anion Gap   Date Value Ref Range Status   03/06/2020 5 3 - 14 mmol/L Final     Glucose   Date Value Ref Range Status   03/06/2020 97 70 - 99 mg/dL Final     Urea Nitrogen   Date Value Ref Range Status   03/06/2020 13 7 - 30 mg/dL Final     Creatinine   Date Value Ref Range Status   03/06/2020 0.96 0.66 - 1.25 mg/dL Final     GFR Estimate   Date Value Ref Range Status   03/06/2020 >90 >60 mL/min/[1.73_m2] Final     Comment:     Non  GFR Calc  Starting 12/18/2018, serum creatinine based estimated GFR (eGFR) will be   calculated using the Chronic Kidney Disease Epidemiology Collaboration   (CKD-EPI) equation.       Calcium   Date Value Ref Range Status   03/06/2020 9.1 8.5 - 10.1 mg/dL Final     Liver Function Studies -   Recent Labs   Lab Test 03/06/20  0835   PROTTOTAL 7.5   ALBUMIN 4.2   BILITOTAL 0.6   ALKPHOS 65   AST 23   ALT 41        Latest Reference Range & Units 03/06/20 08:35   PTT 22 - 37 sec 28   Antithrombin III Chromogenic 85 - 135 % 107   Lupus Result NEG^Negative  Negative [1]   Prot C Chromogenic 70 - 170 % 100   Protein S Free 70 - 148 % 165 (H) [2]   LUPUS ANTICOAGULANT PANEL   Rpt   Cardiolipin Antibody IgG 0.0 - 19.9 GPL-U/mL <1.6 [3]   Cardiolipin Antibody IgM 0.0 - 19.9 MPL-U/mL <0.2 [4]   Beta 2 Glycoprotein 1 Antibody IgG <7 U/mL <0.6 [5]   Beta 2 Glycoprotein 1 Antibody IgM <7 U/mL <2.9 [6]   FACTOR 2 AND " 5 MUTATION ANALYSIS   Neg       Imagin19:   Positive for occlusive thrombus in one of the posterior tibial veins of the proximal to mid left calf.     1/15/20:  The common femoral, greater saphenous origin, femoral, popliteal, and  deep calf veins are visualized and are patent. Venous waveforms are  normal. There is normal response to compression.     2020:  No deep vein thrombosis in the left lower extremity.    Assessment:  In summary, Lino is a 44 year old man with history of unprovoked distal DVT of left lower extremity in 2019 that has now resolved. Due to his extensive maternal family history of clotting events, we elected to keep him on long term anticoagulation. With Xarelto, he has not had any further thromboembolic events nor bleeding events.     1. Unprovoked DVT , on chronic anticoagulation with Xarelto 20mg once daily   2. Family history of VTE, maternal side  - negative thrombophilia evaluation   3. Family history of atrial fibrillation     Plan:  Lino remains a good candidate for long term anticoagulation.   It is reasonable for him to continue on Xarelto 10mg once daily for ongoing venous thromboembolism prophylaxis. He prefers our clinic to continue prescribing. He just got a year's worth of refills through his primary.   CMP to be updated due to elevated creatinine in the fall. Need to ensure creatinine clearance reasonable for Xarelto. If any further renal decline, would change to Eliquis.   Instructed him to contact the clinic with any scheduled procedures for further instructions on holding anticoagulation. For colonoscopy, I recommend holding x 48 hours prior and resuming 24 hours afterwards.      Patient instructed to contact the clinic should they require any surgical procedures for perioperative planning.      Return to clinic in 1 year, sooner if any concerns.       Nissa Torrez, MPH, PA-C  [facility]    20 minutes spent by me on the date of the encounter doing chart review, review of outside records, review of test results, interpretation of tests, patient visit, and documentation

## 2025-05-01 ENCOUNTER — OFFICE VISIT (OUTPATIENT)
Dept: HEMATOLOGY | Facility: CLINIC | Age: 45
End: 2025-05-01
Attending: PHYSICIAN ASSISTANT
Payer: COMMERCIAL

## 2025-05-01 VITALS
WEIGHT: 252.8 LBS | HEART RATE: 94 BPM | BODY MASS INDEX: 38.44 KG/M2 | DIASTOLIC BLOOD PRESSURE: 84 MMHG | OXYGEN SATURATION: 98 % | TEMPERATURE: 97.1 F | SYSTOLIC BLOOD PRESSURE: 157 MMHG

## 2025-05-01 DIAGNOSIS — Z86.718 PERSONAL HISTORY OF DVT (DEEP VEIN THROMBOSIS): Primary | ICD-10-CM

## 2025-05-01 DIAGNOSIS — R79.89 ELEVATED SERUM CREATININE: ICD-10-CM

## 2025-05-01 DIAGNOSIS — Z82.49 FAMILY HISTORY OF DVT: ICD-10-CM

## 2025-05-01 DIAGNOSIS — Z71.89 ENCOUNTER FOR ANTICOAGULATION DISCUSSION AND COUNSELING: ICD-10-CM

## 2025-05-01 DIAGNOSIS — Z79.01 CHRONIC ANTICOAGULATION: ICD-10-CM

## 2025-05-01 LAB
ALBUMIN SERPL BCG-MCNC: 4.5 G/DL (ref 3.5–5.2)
ALP SERPL-CCNC: 60 U/L (ref 40–150)
ALT SERPL W P-5'-P-CCNC: 60 U/L (ref 0–70)
ANION GAP SERPL CALCULATED.3IONS-SCNC: 10 MMOL/L (ref 7–15)
AST SERPL W P-5'-P-CCNC: 41 U/L (ref 0–45)
BILIRUB SERPL-MCNC: 0.3 MG/DL
BUN SERPL-MCNC: 19 MG/DL (ref 6–20)
CALCIUM SERPL-MCNC: 9.4 MG/DL (ref 8.8–10.4)
CHLORIDE SERPL-SCNC: 104 MMOL/L (ref 98–107)
CREAT SERPL-MCNC: 1.09 MG/DL (ref 0.67–1.17)
EGFRCR SERPLBLD CKD-EPI 2021: 86 ML/MIN/1.73M2
GLUCOSE SERPL-MCNC: 103 MG/DL (ref 70–99)
HCO3 SERPL-SCNC: 23 MMOL/L (ref 22–29)
POTASSIUM SERPL-SCNC: 4 MMOL/L (ref 3.4–5.3)
PROT SERPL-MCNC: 7.1 G/DL (ref 6.4–8.3)
SODIUM SERPL-SCNC: 137 MMOL/L (ref 135–145)

## 2025-05-01 PROCEDURE — 36415 COLL VENOUS BLD VENIPUNCTURE: CPT | Performed by: PHYSICIAN ASSISTANT

## 2025-05-01 PROCEDURE — 84155 ASSAY OF PROTEIN SERUM: CPT | Performed by: PHYSICIAN ASSISTANT

## 2025-05-01 PROCEDURE — 99213 OFFICE O/P EST LOW 20 MIN: CPT | Performed by: PHYSICIAN ASSISTANT

## 2025-05-01 RX ORDER — BUPROPION HYDROCHLORIDE 150 MG/1
1 TABLET ORAL DAILY
COMMUNITY
Start: 2024-07-16 | End: 2025-07-16

## 2025-05-01 RX ORDER — AMITRIPTYLINE HYDROCHLORIDE 10 MG/1
10 TABLET ORAL AT BEDTIME
COMMUNITY
Start: 2025-04-10 | End: 2025-05-24

## 2025-05-01 NOTE — PATIENT INSTRUCTIONS
[facility]  [facility]  [facility] [address], [address], [place] [address]  Main: [phone], Fax: [phone]    Harish,   It was a pleasure seeing you today.  Thank you for allowing us to be involved in your care.  Please let us know if there is anything else we can do for you, so that we can be sure you are leaving completely satisfied with your care experience. Below is the plan that we discussed.     Stay on Xarelto at 10mg once daily. This lower dose does NOT have to be taken with food. Just take within same 2 hour time frame each day.   For procedure, hold Xarelto x 48 hours prior to procedure and resume the next day.   Call if any new bleeding or bruising concerns. Ok to stay on amitriptyline unless new bruising/bleeding.       We would like a provider on our team to see you at least annually for optimal care and to allow us to continue to prescribe for you.        Return to clinic in  in one year.    If you have questions or concerns, please don't hesitate to send a Mobile Active Defense Message for non urgent matters or contact my nurse clinician, Brandie. Her number is [phone]. If they are unavailable and you have immediate concerns, please call [phone] and ask for a nurse.     Nissa Torrez, MPH, PA-C  [facility] for [facility]

## 2025-07-13 ENCOUNTER — HEALTH MAINTENANCE LETTER (OUTPATIENT)
Age: 45
End: 2025-07-13